# Patient Record
Sex: FEMALE | Race: OTHER
[De-identification: names, ages, dates, MRNs, and addresses within clinical notes are randomized per-mention and may not be internally consistent; named-entity substitution may affect disease eponyms.]

---

## 2018-12-20 ENCOUNTER — HOSPITAL ENCOUNTER (EMERGENCY)
Age: 43
LOS: 1 days | Discharge: HOME | End: 2018-12-21

## 2018-12-20 ENCOUNTER — HOSPITAL ENCOUNTER (EMERGENCY)
Dept: HOSPITAL 91 - E/R | Age: 43
LOS: 1 days | Discharge: HOME | End: 2018-12-21
Payer: COMMERCIAL

## 2018-12-20 DIAGNOSIS — R07.89: Primary | ICD-10-CM

## 2018-12-20 DIAGNOSIS — R06.00: ICD-10-CM

## 2018-12-20 DIAGNOSIS — R07.81: ICD-10-CM

## 2018-12-20 LAB
ADD MAN DIFF?: NO
ADD UMIC: YES
ALANINE AMINOTRANSFERASE: 19 IU/L (ref 13–69)
ALBUMIN/GLOBULIN RATIO: 1.38
ALBUMIN: 4.3 G/DL (ref 3.3–4.9)
ALKALINE PHOSPHATASE: 86 IU/L (ref 42–121)
ANION GAP: 10 (ref 5–13)
ASPARTATE AMINO TRANSFERASE: 27 IU/L (ref 15–46)
BASOPHIL #: 0.1 10^3/UL (ref 0–0.1)
BASOPHILS %: 0.6 % (ref 0–2)
BILIRUBIN,DIRECT: 0 MG/DL (ref 0–0.2)
BILIRUBIN,TOTAL: 0.1 MG/DL (ref 0.2–1.3)
BLOOD UREA NITROGEN: 12 MG/DL (ref 7–20)
CALCIUM: 9.1 MG/DL (ref 8.4–10.2)
CARBON DIOXIDE: 28 MMOL/L (ref 21–31)
CHLORIDE: 104 MMOL/L (ref 97–110)
CREATININE: 0.58 MG/DL (ref 0.44–1)
EOSINOPHILS #: 0.4 10^3/UL (ref 0–0.5)
EOSINOPHILS %: 4.6 % (ref 0–7)
GLOBULIN: 3.1 G/DL (ref 1.3–3.2)
GLUCOSE: 93 MG/DL (ref 70–220)
HEMATOCRIT: 37.8 % (ref 37–47)
HEMOGLOBIN: 12.1 G/DL (ref 12–16)
IMMATURE GRANS #M: 0.01 10^3/UL (ref 0–0.03)
IMMATURE GRANS % (M): 0.1 % (ref 0–0.43)
INR: 0.98
LIPASE: 87 U/L (ref 23–300)
LYMPHOCYTES #: 3.1 10^3/UL (ref 0.8–2.9)
LYMPHOCYTES %: 35.2 % (ref 15–51)
MEAN CORPUSCULAR HEMOGLOBIN: 28.3 PG (ref 29–33)
MEAN CORPUSCULAR HGB CONC: 32 G/DL (ref 32–37)
MEAN CORPUSCULAR VOLUME: 88.3 FL (ref 82–101)
MEAN PLATELET VOLUME: 9.9 FL (ref 7.4–10.4)
MONOCYTE #: 0.7 10^3/UL (ref 0.3–0.9)
MONOCYTES %: 7.8 % (ref 0–11)
NEUTROPHIL #: 4.5 10^3/UL (ref 1.6–7.5)
NEUTROPHILS %: 51.7 % (ref 39–77)
NUCLEATED RED BLOOD CELLS #: 0 10^3/UL (ref 0–0)
NUCLEATED RED BLOOD CELLS%: 0 /100WBC (ref 0–0)
PLATELET COUNT: 266 10^3/UL (ref 140–415)
POTASSIUM: 4.2 MMOL/L (ref 3.5–5.1)
PROTIME: 13.1 SEC (ref 11.9–14.9)
PT RATIO: 1
RED BLOOD COUNT: 4.28 10^6/UL (ref 4.2–5.4)
RED CELL DISTRIBUTION WIDTH: 13.1 % (ref 11.5–14.5)
SODIUM: 142 MMOL/L (ref 135–144)
TOTAL PROTEIN: 7.4 G/DL (ref 6.1–8.1)
TROPONIN-I: < 0.012 NG/ML (ref 0–0.12)
UR ASCORBIC ACID: NEGATIVE MG/DL
UR BACTERIA: (no result) /HPF
UR BILIRUBIN (DIP): NEGATIVE MG/DL
UR BLOOD (DIP): NEGATIVE MG/DL
UR CLARITY: CLEAR
UR COLOR: (no result)
UR GLUCOSE (DIP): NEGATIVE MG/DL
UR KETONES (DIP): NEGATIVE MG/DL
UR LEUKOCYTE ESTERASE (DIP): (no result) LEU/UL
UR NITRITE (DIP): NEGATIVE MG/DL
UR PH (DIP): 8 (ref 5–9)
UR RBC: 0 /HPF (ref 0–5)
UR SPECIFIC GRAVITY (DIP): 1.01 (ref 1–1.03)
UR SQUAMOUS EPITHELIAL CELL: (no result) /HPF
UR TOTAL PROTEIN (DIP): NEGATIVE MG/DL
UR UROBILINOGEN (DIP): NEGATIVE MG/DL
UR WBC: 1 /HPF (ref 0–5)
WHITE BLOOD COUNT: 8.8 10^3/UL (ref 4.8–10.8)

## 2018-12-20 PROCEDURE — 83690 ASSAY OF LIPASE: CPT

## 2018-12-20 PROCEDURE — 99284 EMERGENCY DEPT VISIT MOD MDM: CPT

## 2018-12-20 PROCEDURE — 81001 URINALYSIS AUTO W/SCOPE: CPT

## 2018-12-20 PROCEDURE — 85610 PROTHROMBIN TIME: CPT

## 2018-12-20 PROCEDURE — 93005 ELECTROCARDIOGRAM TRACING: CPT

## 2018-12-20 PROCEDURE — 85025 COMPLETE CBC W/AUTO DIFF WBC: CPT

## 2018-12-20 PROCEDURE — 36415 COLL VENOUS BLD VENIPUNCTURE: CPT

## 2018-12-20 PROCEDURE — 71045 X-RAY EXAM CHEST 1 VIEW: CPT

## 2018-12-20 PROCEDURE — 80053 COMPREHEN METABOLIC PANEL: CPT

## 2018-12-20 PROCEDURE — 84703 CHORIONIC GONADOTROPIN ASSAY: CPT

## 2018-12-20 PROCEDURE — 84484 ASSAY OF TROPONIN QUANT: CPT

## 2018-12-20 RX ADMIN — THIAMINE HYDROCHLORIDE 1 MLS/HR: 100 INJECTION, SOLUTION INTRAMUSCULAR; INTRAVENOUS at 22:29

## 2018-12-21 RX ADMIN — LORAZEPAM 1 MG: 2 INJECTION, SOLUTION INTRAMUSCULAR; INTRAVENOUS at 01:08

## 2019-04-11 ENCOUNTER — EVALUATION (OUTPATIENT)
Dept: PHYSICAL THERAPY | Facility: CLINIC | Age: 44
End: 2019-04-11
Payer: COMMERCIAL

## 2019-04-11 DIAGNOSIS — M54.41 ACUTE RIGHT-SIDED LOW BACK PAIN WITH RIGHT-SIDED SCIATICA: Primary | ICD-10-CM

## 2019-04-11 PROCEDURE — 97161 PT EVAL LOW COMPLEX 20 MIN: CPT

## 2019-04-15 ENCOUNTER — OFFICE VISIT (OUTPATIENT)
Dept: PHYSICAL THERAPY | Facility: CLINIC | Age: 44
End: 2019-04-15
Payer: COMMERCIAL

## 2019-04-15 DIAGNOSIS — M54.41 ACUTE RIGHT-SIDED LOW BACK PAIN WITH RIGHT-SIDED SCIATICA: Primary | ICD-10-CM

## 2019-04-15 PROCEDURE — 97112 NEUROMUSCULAR REEDUCATION: CPT | Performed by: PHYSICAL THERAPIST

## 2019-04-18 ENCOUNTER — OFFICE VISIT (OUTPATIENT)
Dept: PHYSICAL THERAPY | Facility: CLINIC | Age: 44
End: 2019-04-18
Payer: COMMERCIAL

## 2019-04-18 DIAGNOSIS — M54.41 ACUTE RIGHT-SIDED LOW BACK PAIN WITH RIGHT-SIDED SCIATICA: Primary | ICD-10-CM

## 2019-04-18 PROCEDURE — 97112 NEUROMUSCULAR REEDUCATION: CPT | Performed by: PHYSICAL THERAPIST

## 2019-04-18 PROCEDURE — 97140 MANUAL THERAPY 1/> REGIONS: CPT | Performed by: PHYSICAL THERAPIST

## 2019-04-18 PROCEDURE — 97110 THERAPEUTIC EXERCISES: CPT | Performed by: PHYSICAL THERAPIST

## 2019-04-22 ENCOUNTER — APPOINTMENT (OUTPATIENT)
Dept: PHYSICAL THERAPY | Facility: CLINIC | Age: 44
End: 2019-04-22
Payer: COMMERCIAL

## 2019-04-23 ENCOUNTER — HOSPITAL ENCOUNTER (EMERGENCY)
Dept: HOSPITAL 91 - FTE | Age: 44
Discharge: HOME | End: 2019-04-23
Payer: COMMERCIAL

## 2019-04-23 ENCOUNTER — HOSPITAL ENCOUNTER (EMERGENCY)
Dept: HOSPITAL 10 - FTE | Age: 44
Discharge: HOME | End: 2019-04-23
Payer: COMMERCIAL

## 2019-04-23 VITALS
WEIGHT: 136.69 LBS | HEIGHT: 63 IN | BODY MASS INDEX: 24.22 KG/M2 | BODY MASS INDEX: 24.22 KG/M2 | HEIGHT: 63 IN | WEIGHT: 136.69 LBS

## 2019-04-23 VITALS — DIASTOLIC BLOOD PRESSURE: 83 MMHG | HEART RATE: 95 BPM | RESPIRATION RATE: 18 BRPM | SYSTOLIC BLOOD PRESSURE: 151 MMHG

## 2019-04-23 DIAGNOSIS — R09.89: Primary | ICD-10-CM

## 2019-04-23 PROCEDURE — 96372 THER/PROPH/DIAG INJ SC/IM: CPT

## 2019-04-23 PROCEDURE — 99284 EMERGENCY DEPT VISIT MOD MDM: CPT

## 2019-04-23 RX ADMIN — FAMOTIDINE 1 MG: 20 TABLET ORAL at 07:47

## 2019-04-23 RX ADMIN — DEXAMETHASONE SODIUM PHOSPHATE 1 MG: 10 INJECTION, SOLUTION INTRAMUSCULAR; INTRAVENOUS at 07:47

## 2019-04-23 NOTE — ERD
ER Documentation


Chief Complaint


Chief Complaint





pt bib self with c/o scratchy throat , after possibly taking some meds,





HPI


43-year-old female presenting with a scratchy throat and irritation after taking


medication at home.  Patient took baclofen and medication for her migraine.  She


took some Benadryl last night that alleviated her symptoms however she still 


feels that there is some scratching irritation to her throat.  She is worried 


about allergic reaction.  She also has some sores under her tongue which are 


irritating and have been there for the last few weeks.  Denies any fevers.  


Denies medical problems.  NKDA.  Surgical history denies.  Social history denies





ROS


All systems reviewed and are negative except as per history of present illness.





Medications


Home Meds


Active Scripts


Hydrocortisone* Topical (Hydrocortisone* Topical) 2.5%-28.3 Gm Cream..g., 1 


APPLIC TOP BID, #1 TUB


   Prov:GIOVANNA STEWART PA-C         4/23/19


Nystatin (Nystatin) 100,000 Unit/1 Ml Oral.susp, 2 ML PO QID for 7 Days, OZ


   Swish and swallow


   Prov:GIOVANNA STEWART PA-C         4/23/19


Carbamide Peroxide (Gly-Oxide) 15 Ml Solution, 15 ML MM BID, #1


   Prov:GIOVANNA STEWART PA-C         4/23/19


Methylprednisolone* (Medrol* DOSE PACK) 4 Mg/Dose-Pack Tab.ds.pk, 4 MG PO .AS 


DIRECTED, #1 PACKET


   Prov:GIOVANNA STEWART PA-C         4/23/19


Diphenhydramine Hcl* (Benadryl*) 25 Mg Cap, 25 MG PO Q6, #30 CAP


   Prov:GIOVANNA STEWART PA-C         4/23/19


Ibuprofen* (Motrin*) 600 Mg Tab, 600 MG PO Q6H PRN for PAIN AND/OR INFLAMMATION,


#30 TAB


   Prov:GEREMIAS MOORE MD         12/21/18


Reported Medications


Sumatriptan Succinate* (Imitrex*) Unknown Strength Tablet, 1 TAB PO AS NEEDED 


PRN for MIGRAINE HEADACHE, TAB


   May repeat after 2 hours if needed;  mg/24 hours


   12/20/18


Ibuprofen* (Ibuprofen*) 600 Mg Tablet, 600 MG PO Q6H PRN for AS NEEDED, TAB


   12/20/18





Allergies


Allergies:  


Coded Allergies:  


     No Known Allergy (Unverified , 12/20/18)





PMhx/Soc


Medical and Surgical Hx:  pt denies Medical Hx, pt denies Surgical Hx


History of Surgery:  No


Anesthesia Reaction:  No


Hx Neurological Disorder:  No


Hx Respiratory Disorders:  No


Hx Cardiac Disorders:  No


Hx Psychiatric Problems:  No


Hx Miscellaneous Medical Probl:  No


Hx Alcohol Use:  No


Hx Substance Use:  No


Hx Tobacco Use:  No


Smoking Status:  Never smoker





FmHx


Family History:  No diabetes, No coronary disease, No other





Physical Exam


Vitals





Vital Signs


  Date      Temp  Pulse  Resp  B/P (MAP)   Pulse Ox  O2          O2 Flow    FiO2


Time                                                 Delivery    Rate


   4/23/19  97.6     95    18      151/83       100


     07:02                          (105)





Physical Exam


GENERAL: The patient is well-appearing, well-nourished, in no acute distress


HEENT: Atraumatic.  Conjunctivae are pink.  Pupils equal, round, and reactive to


light.  There is no scleral icterus.  Tympanic membranes clear bilaterally.  


Oropharynx clear.  No nystagmus or photophobia.  Small open sores noted under 


the tongue adjacent to the frenulum.


NECK: C-spine is soft and supple.  There is no meningismus.  There is no 


cervical lymphadenopathy.  


CHEST: Clear to auscultation bilaterally.  There are no rales, wheezes or rho


nchi.


HEART: Regular rate and rhythm.  No murmurs, clicks, rubs or gallops.  


SKIN: Erythematous site noted to the right arm with small nodule with no 


fluctuance or purulence.  No lymphatic streaking.  No vesicles


Results 24 hrs





Current Medications


 Medications
   Dose
          Sig/Fermin
       Start Time
   Status  Last


 (Trade)       Ordered        Route
 PRN     Stop Time              Admin
Dose


                              Reason                                Admin


                10 mg          ONCE  ONCE
    4/23/19       DC           4/23/19


Dexamethasone                 IM
            07:30
                       07:47




  (Decadron)                                4/23/19 07:31


 Famotidine
    20 mg          ONCE  ONCE
    4/23/19       DC           4/23/19


(Pepcid)                      PO
            07:30
                       07:47



                                             4/23/19 07:31








Procedures/MDM


MDM: 43-year-old female presenting with throat irritation.  I have low suspicion


for bacterial infection.  Patient may have a component of thrush however there 


is no white exudates noted.  I will treat given the patient has had symptoms for


the last 2 weeks with no resolution however I have high suspicion that this is 


likely viral.  I have low suspicion for anaphylaxis or angioedema.  Patient's 


exam is non-concerning.  Patient is discharged with strict ER precautions and 


told to follow-up with primary care within 1-2 days for close evaluation.  All 


questions answered at discharge





Departure


Diagnosis:  


   Primary Impression:  


   Allergic reaction


Condition:  Stable


Patient Instructions:  Allergic Reaction, Drug


Referrals:  


[facility]


YOU HAVE RECEIVED A MEDICAL SCREENING EXAM AND THE RESULTS INDICATE THAT YOU DO 


NOT HAVE A CONDITION THAT REQUIRES URGENT TREATMENT IN THE EMERGENCY DEPARTMENT.





FURTHER EVALUATION AND TREATMENT OF YOUR CONDITION CAN WAIT UNTIL YOU ARE SEEN 


IN YOUR DOCTORS OFFICE WITHIN THE NEXT 1-2 DAYS. IT IS YOUR RESPONSIBILITY TO 


MAKE AN APPOINTMENT FOR FOLOW-UP CARE.





IF YOU HAVE A PRIMARY DOCTOR


--you should call your primary doctor and schedule an appointment





IF YOU DO NOT HAVE A PRIMARY DOCTOR YOU CAN CALL OUR PHYSICIAN REFERRAL HOTLINE 


AT


 [phone] 





IF YOU CAN NOT AFFORD TO SEE A PHYSICIAN YOU CAN CHOSE FROM THE FOLLOWING 


[facility]





[facility] [phone] [address] [facility] [phone] [address] [facility]. [facility] [phone] [address] [facility] [phone] [address] [facility] [phone]


[address] [facility]. [facility]. [phone]


[address]





Additional Instructions:  


FOLLOW UP WITH YOUR PRIMARY CARE PHYSICIAN TOMORROW.Return to this facility if 


you are not improving as expected.











GIOVANNA STEWART PA-C       Apr 23, 2019 08:24

## 2019-04-25 ENCOUNTER — APPOINTMENT (OUTPATIENT)
Dept: PHYSICAL THERAPY | Facility: CLINIC | Age: 44
End: 2019-04-25
Payer: COMMERCIAL

## 2024-04-18 ENCOUNTER — OFFICE VISIT (OUTPATIENT)
Dept: FAMILY MEDICINE CLINIC | Facility: CLINIC | Age: 49
End: 2024-04-18
Payer: COMMERCIAL

## 2024-04-18 VITALS
OXYGEN SATURATION: 99 % | DIASTOLIC BLOOD PRESSURE: 76 MMHG | HEIGHT: 63 IN | BODY MASS INDEX: 22.5 KG/M2 | SYSTOLIC BLOOD PRESSURE: 116 MMHG | WEIGHT: 127 LBS | HEART RATE: 79 BPM

## 2024-04-18 DIAGNOSIS — Z13.1 SCREENING FOR DIABETES MELLITUS: ICD-10-CM

## 2024-04-18 DIAGNOSIS — Z12.4 CERVICAL CANCER SCREENING: ICD-10-CM

## 2024-04-18 DIAGNOSIS — Z11.59 NEED FOR HEPATITIS C SCREENING TEST: ICD-10-CM

## 2024-04-18 DIAGNOSIS — R53.83 OTHER FATIGUE: ICD-10-CM

## 2024-04-18 DIAGNOSIS — Z78.9 VEGETARIAN: ICD-10-CM

## 2024-04-18 DIAGNOSIS — Z12.11 SCREEN FOR COLON CANCER: ICD-10-CM

## 2024-04-18 DIAGNOSIS — Z12.31 ENCOUNTER FOR SCREENING MAMMOGRAM FOR BREAST CANCER: ICD-10-CM

## 2024-04-18 DIAGNOSIS — L84 CALLUS OF FOOT: Primary | ICD-10-CM

## 2024-04-18 DIAGNOSIS — Z13.6 SCREENING FOR CARDIOVASCULAR CONDITION: ICD-10-CM

## 2024-04-18 PROCEDURE — 99204 OFFICE O/P NEW MOD 45 MIN: CPT | Performed by: FAMILY MEDICINE

## 2024-04-18 NOTE — ASSESSMENT & PLAN NOTE
She is vegetarian she does not eat fish or chicken, she feels fatigue, will get her B12 level check

## 2024-04-18 NOTE — ASSESSMENT & PLAN NOTE
She has bilateral calluses on big toes medial part on both feet, she feels irritation and the turn white in color after standing for hours, she will see the podiatrist

## 2024-04-18 NOTE — ASSESSMENT & PLAN NOTE
Fatigue lately for long time, she says she has been very active she does not take rest she is working at [employer] and at home she is very active, she is purely vegetarian, advised to get labs and follow-up

## 2024-04-18 NOTE — PROGRESS NOTES
Name: Reddy Houston      : 1975      MRN: [id number]  Encounter Provider: Darlyn Andrew MD  Encounter Date: 2024   Encounter department: [facility]    Assessment & Plan     1. Callus of foot  Assessment & Plan:  She has bilateral calluses on big toes medial part on both feet, she feels irritation and the turn white in color after standing for hours, she will see the podiatrist    Orders:  -     Ambulatory Referral to Podiatry; Future    2. Encounter for screening mammogram for breast cancer  -     Mammo screening bilateral w 3d & cad; Future    3. Screen for colon cancer  -     Cologuard    4. Cervical cancer screening  -     Ambulatory Referral to Obstetrics / Gynecology; Future    5. Need for hepatitis C screening test  -     Hepatitis C antibody; Future    6. Other fatigue  Assessment & Plan:  Fatigue lately for long time, she says she has been very active she does not take rest she is working at [employer] and at home she is very active, she is purely vegetarian, advised to get labs and follow-up    Orders:  -     CBC and differential; Future; Expected date: 2024  -     Comprehensive metabolic panel; Future; Expected date: 2024  -     Lipid panel; Future; Expected date: 2024  -     TSH, 3rd generation; Future; Expected date: 2024  -     Methylmalonic acid, serum; Future    7. Screening for diabetes mellitus  -     CBC and differential; Future; Expected date: 2024  -     Comprehensive metabolic panel; Future; Expected date: 2024  -     Hemoglobin A1C; Future; Expected date: 2024    8. Screening for cardiovascular condition  -     CBC and differential; Future; Expected date: 2024  -     Comprehensive metabolic panel; Future; Expected date: 2024  -     Lipid panel; Future; Expected date: 2024  -     TSH, 3rd generation; Future; Expected date: 2024    9. Vegetarian  Assessment & Plan:  She is vegetarian she does not eat  fish or chicken, she feels fatigue, will get her B12 level check    Orders:  -     Methylmalonic acid, serum; Future         Subjective     She is a new patient, she says she has not been to the doctor for many years.  He is  lives with her 2 children, she has 2 C-sections .  She is a vegetarian, she says she has been very active she works at [employer] and she is busy all the time, but lately she has been feeling more tired fatigue she has menopause for almost 1 year initially she had menopausal symptoms like hot flashes craving for the sweets and then she gained weight but now she is back to her normal diet and her weight is back to her normal.      Review of Systems   Constitutional:  Positive for fatigue. Negative for activity change, appetite change, chills, fever and unexpected weight change.   HENT:  Negative for congestion, ear discharge, ear pain, nosebleeds, postnasal drip, rhinorrhea, sinus pressure, sneezing, sore throat, trouble swallowing and voice change.    Eyes:  Negative for photophobia, pain, discharge, redness and itching.   Respiratory:  Negative for cough, chest tightness, shortness of breath and wheezing.    Cardiovascular:  Negative for chest pain, palpitations and leg swelling.   Gastrointestinal:  Negative for abdominal pain, constipation, diarrhea, nausea and vomiting.   Endocrine: Negative for polyuria.   Genitourinary:  Negative for dysuria, frequency and urgency.   Musculoskeletal:  Negative for arthralgias, back pain, myalgias and neck pain.   Skin:  Negative for color change, pallor and rash.        She has callus on feet and gets irritation in the callus after standing for hours, callus on medial part of big toes    Allergic/Immunologic: Negative for environmental allergies and food allergies.   Neurological:  Negative for dizziness, weakness, light-headedness and headaches.   Hematological:  Negative for adenopathy. Does not bruise/bleed easily.   Psychiatric/Behavioral:   "Negative for behavioral problems and sleep disturbance. The patient is not nervous/anxious.        History reviewed. No pertinent past medical history.  History reviewed. No pertinent surgical history.  History reviewed. No pertinent family history.  Social History     Socioeconomic History   • Marital status: /Civil Union     Spouse name: None   • Number of children: None   • Years of education: None   • Highest education level: None   Occupational History   • None   Tobacco Use   • Smoking status: Never   • Smokeless tobacco: Never   Vaping Use   • Vaping status: Never Used   Substance and Sexual Activity   • Alcohol use: Never   • Drug use: Never   • Sexual activity: None   Other Topics Concern   • None   Social History Narrative   • None     Social Determinants of Health     Financial Resource Strain: Not on file   Food Insecurity: Not on file   Transportation Needs: Not on file   Physical Activity: Not on file   Stress: Not on file   Social Connections: Not on file   Intimate Partner Violence: Not on file   Housing Stability: Not on file     No current outpatient medications on file prior to visit.     No Known Allergies  Immunization History   Administered Date(s) Administered   • COVID-19 PFIZER VACCINE 0.3 ML IM 04/08/2021, 04/30/2021       Objective     /76   Pulse 79   Ht 5' 3\" (1.6 m)   Wt 57.6 kg (127 lb)   SpO2 99%   BMI 22.50 kg/m²     Physical Exam  Vitals and nursing note reviewed.   Constitutional:       Appearance: Normal appearance. She is well-developed. She is not ill-appearing or toxic-appearing.   HENT:      Right Ear: Tympanic membrane normal.      Left Ear: Tympanic membrane normal.      Nose: Nose normal.   Eyes:      Extraocular Movements: Extraocular movements intact.   Neck:      Thyroid: No thyromegaly.   Cardiovascular:      Rate and Rhythm: Normal rate and regular rhythm.      Heart sounds: Normal heart sounds. No murmur heard.  Pulmonary:      Effort: Pulmonary " effort is normal.      Breath sounds: Normal breath sounds. No wheezing or rales.   Abdominal:      Palpations: Abdomen is soft.   Musculoskeletal:      Cervical back: Normal range of motion and neck supple.      Right lower leg: No edema.      Left lower leg: No edema.   Lymphadenopathy:      Cervical: No cervical adenopathy.   Skin:     Findings: No erythema or rash.      Comments: Callus on medial part of big toes    Neurological:      General: No focal deficit present.      Mental Status: She is alert.   Psychiatric:         Mood and Affect: Mood normal.     Darlyn Andrew MD

## 2024-04-30 ENCOUNTER — TELEPHONE (OUTPATIENT)
Age: 49
End: 2024-04-30

## 2024-04-30 NOTE — TELEPHONE ENCOUNTER
Patient calling to request when her appointments were scheduled.  All questions answered. Reviewed patient's mammogram and annual dates and times and she verbalized understanding.

## 2024-05-03 ENCOUNTER — LAB (OUTPATIENT)
Dept: LAB | Facility: CLINIC | Age: 49
End: 2024-05-03
Payer: COMMERCIAL

## 2024-05-03 DIAGNOSIS — R53.83 OTHER FATIGUE: ICD-10-CM

## 2024-05-03 DIAGNOSIS — Z13.1 SCREENING FOR DIABETES MELLITUS: ICD-10-CM

## 2024-05-03 DIAGNOSIS — Z78.9 VEGETARIAN: ICD-10-CM

## 2024-05-03 DIAGNOSIS — Z11.59 NEED FOR HEPATITIS C SCREENING TEST: ICD-10-CM

## 2024-05-03 DIAGNOSIS — Z13.6 SCREENING FOR CARDIOVASCULAR CONDITION: ICD-10-CM

## 2024-05-03 LAB
ALBUMIN SERPL BCP-MCNC: 4.5 G/DL (ref 3.5–5)
ALP SERPL-CCNC: 81 U/L (ref 34–104)
ALT SERPL W P-5'-P-CCNC: 13 U/L (ref 7–52)
ANION GAP SERPL CALCULATED.3IONS-SCNC: 7 MMOL/L (ref 4–13)
AST SERPL W P-5'-P-CCNC: 17 U/L (ref 13–39)
BASOPHILS # BLD AUTO: 0.02 THOUSANDS/ÂΜL (ref 0–0.1)
BASOPHILS NFR BLD AUTO: 0 % (ref 0–1)
BILIRUB SERPL-MCNC: 0.67 MG/DL (ref 0.2–1)
BUN SERPL-MCNC: 11 MG/DL (ref 5–25)
CALCIUM SERPL-MCNC: 9.6 MG/DL (ref 8.4–10.2)
CHLORIDE SERPL-SCNC: 107 MMOL/L (ref 96–108)
CHOLEST SERPL-MCNC: 156 MG/DL
CO2 SERPL-SCNC: 27 MMOL/L (ref 21–32)
CREAT SERPL-MCNC: 0.59 MG/DL (ref 0.6–1.3)
EOSINOPHIL # BLD AUTO: 0.07 THOUSAND/ÂΜL (ref 0–0.61)
EOSINOPHIL NFR BLD AUTO: 1 % (ref 0–6)
ERYTHROCYTE [DISTWIDTH] IN BLOOD BY AUTOMATED COUNT: 13 % (ref 11.6–15.1)
GFR SERPL CREATININE-BSD FRML MDRD: 109 ML/MIN/1.73SQ M
GLUCOSE P FAST SERPL-MCNC: 87 MG/DL (ref 65–99)
HCT VFR BLD AUTO: 39 % (ref 34.8–46.1)
HCV AB SER QL: NORMAL
HDLC SERPL-MCNC: 60 MG/DL
HGB BLD-MCNC: 12.8 G/DL (ref 11.5–15.4)
IMM GRANULOCYTES # BLD AUTO: 0.01 THOUSAND/UL (ref 0–0.2)
IMM GRANULOCYTES NFR BLD AUTO: 0 % (ref 0–2)
LDLC SERPL CALC-MCNC: 80 MG/DL (ref 0–100)
LYMPHOCYTES # BLD AUTO: 2 THOUSANDS/ÂΜL (ref 0.6–4.47)
LYMPHOCYTES NFR BLD AUTO: 36 % (ref 14–44)
MCH RBC QN AUTO: 30 PG (ref 26.8–34.3)
MCHC RBC AUTO-ENTMCNC: 32.8 G/DL (ref 31.4–37.4)
MCV RBC AUTO: 91 FL (ref 82–98)
MONOCYTES # BLD AUTO: 0.35 THOUSAND/ÂΜL (ref 0.17–1.22)
MONOCYTES NFR BLD AUTO: 6 % (ref 4–12)
NEUTROPHILS # BLD AUTO: 3.08 THOUSANDS/ÂΜL (ref 1.85–7.62)
NEUTS SEG NFR BLD AUTO: 57 % (ref 43–75)
NONHDLC SERPL-MCNC: 96 MG/DL
NRBC BLD AUTO-RTO: 0 /100 WBCS
PLATELET # BLD AUTO: 185 THOUSANDS/UL (ref 149–390)
PMV BLD AUTO: 12.7 FL (ref 8.9–12.7)
POTASSIUM SERPL-SCNC: 4 MMOL/L (ref 3.5–5.3)
PROT SERPL-MCNC: 7.1 G/DL (ref 6.4–8.4)
RBC # BLD AUTO: 4.27 MILLION/UL (ref 3.81–5.12)
SODIUM SERPL-SCNC: 141 MMOL/L (ref 135–147)
TRIGL SERPL-MCNC: 80 MG/DL
TSH SERPL DL<=0.05 MIU/L-ACNC: 5.69 UIU/ML (ref 0.45–4.5)
WBC # BLD AUTO: 5.53 THOUSAND/UL (ref 4.31–10.16)

## 2024-05-03 PROCEDURE — 85025 COMPLETE CBC W/AUTO DIFF WBC: CPT

## 2024-05-03 PROCEDURE — 84443 ASSAY THYROID STIM HORMONE: CPT

## 2024-05-03 PROCEDURE — 86803 HEPATITIS C AB TEST: CPT

## 2024-05-03 PROCEDURE — 83918 ORGANIC ACIDS TOTAL QUANT: CPT

## 2024-05-03 PROCEDURE — 36415 COLL VENOUS BLD VENIPUNCTURE: CPT

## 2024-05-03 PROCEDURE — 83036 HEMOGLOBIN GLYCOSYLATED A1C: CPT

## 2024-05-03 PROCEDURE — 80061 LIPID PANEL: CPT

## 2024-05-03 PROCEDURE — 80053 COMPREHEN METABOLIC PANEL: CPT

## 2024-05-04 LAB
EST. AVERAGE GLUCOSE BLD GHB EST-MCNC: 123 MG/DL
HBA1C MFR BLD: 5.9 %

## 2024-05-07 ENCOUNTER — TELEPHONE (OUTPATIENT)
Dept: FAMILY MEDICINE CLINIC | Facility: CLINIC | Age: 49
End: 2024-05-07

## 2024-05-07 NOTE — TELEPHONE ENCOUNTER
----- Message from Darlyn Andrew MD sent at 5/7/2024  8:15 AM EDT -----  Patient needs follow-up appointment, has  Abnormal blood work

## 2024-05-08 LAB — METHYLMALONATE SERPL-SCNC: 910 NMOL/L (ref 0–378)

## 2024-05-09 ENCOUNTER — TELEPHONE (OUTPATIENT)
Dept: FAMILY MEDICINE CLINIC | Facility: CLINIC | Age: 49
End: 2024-05-09

## 2024-05-09 NOTE — RESULT ENCOUNTER NOTE
Please schedule her for follow-up visit, she has elevated TSH and B12 deficiency, she needs to have follow-up visit in the office so we can start her treatment

## 2024-05-09 NOTE — TELEPHONE ENCOUNTER
----- Message from Darlyn Andrew MD sent at 5/9/2024 12:02 PM EDT -----  Please schedule her for follow-up visit, she has elevated TSH and B12 deficiency, she needs to have follow-up visit in the office so we can start her treatment

## 2024-05-18 PROBLEM — Z11.59 NEED FOR HEPATITIS C SCREENING TEST: Status: RESOLVED | Noted: 2024-04-18 | Resolved: 2024-05-18

## 2024-05-18 PROBLEM — Z13.6 SCREENING FOR CARDIOVASCULAR CONDITION: Status: RESOLVED | Noted: 2024-04-18 | Resolved: 2024-05-18

## 2024-05-18 PROBLEM — Z12.4 CERVICAL CANCER SCREENING: Status: RESOLVED | Noted: 2024-04-18 | Resolved: 2024-05-18

## 2024-05-21 ENCOUNTER — OFFICE VISIT (OUTPATIENT)
Dept: FAMILY MEDICINE CLINIC | Facility: CLINIC | Age: 49
End: 2024-05-21
Payer: COMMERCIAL

## 2024-05-21 VITALS
SYSTOLIC BLOOD PRESSURE: 100 MMHG | DIASTOLIC BLOOD PRESSURE: 60 MMHG | OXYGEN SATURATION: 95 % | BODY MASS INDEX: 22.68 KG/M2 | HEIGHT: 63 IN | WEIGHT: 128 LBS | RESPIRATION RATE: 16 BRPM | HEART RATE: 94 BPM

## 2024-05-21 DIAGNOSIS — Z78.9 VEGETARIAN: ICD-10-CM

## 2024-05-21 DIAGNOSIS — E53.8 B12 DEFICIENCY: Primary | ICD-10-CM

## 2024-05-21 DIAGNOSIS — E03.8 TSH (THYROID-STIMULATING HORMONE DEFICIENCY): ICD-10-CM

## 2024-05-21 PROCEDURE — 99214 OFFICE O/P EST MOD 30 MIN: CPT | Performed by: FAMILY MEDICINE

## 2024-05-21 RX ORDER — CYANOCOBALAMIN (VITAMIN B-12) 500 MCG
500 TABLET ORAL DAILY
Qty: 90 TABLET | Refills: 2 | Status: SHIPPED | OUTPATIENT
Start: 2024-05-21

## 2024-05-21 NOTE — ASSESSMENT & PLAN NOTE
She feels tired, TSH is high, thyroid seems to be full, nontender, she will get the repeat TSH and T3-T4 in 6 weeks and follow-up if level remains elevated will start her on levothyroxine

## 2024-05-21 NOTE — ASSESSMENT & PLAN NOTE
She has B12 deficiency, advised to take B12 500 mcg daily and will repeat labs in 6 months, she does not want to get lab order now as she will forget, will order next time

## 2024-05-21 NOTE — PROGRESS NOTES
Ambulatory Visit  Name: Reddy Houston      : 1975      MRN: [id number]  Encounter Provider: Darlyn Andrew MD  Encounter Date: 2024   Encounter department: [facility]    Assessment & Plan   1. B12 deficiency  Assessment & Plan:  She has B12 deficiency, advised to take B12 500 mcg daily and will repeat labs in 6 months, she does not want to get lab order now as she will forget, will order next time  Orders:  -     vitamin B-12 (CYANOCOBALAMIN) 500 MCG TABS; Take 1 tablet (500 mcg total) by mouth daily  2. TSH (thyroid-stimulating hormone deficiency)  Assessment & Plan:  She feels tired, TSH is high, thyroid seems to be full, nontender, she will get the repeat TSH and T3-T4 in 6 weeks and follow-up if level remains elevated will start her on levothyroxine  Orders:  -     TSH, 3rd generation with Free T4 reflex; Future; Expected date: 2024  -     T3, free; Future; Expected date: 2024  3. Vegetarian  Assessment & Plan:  Advised to take multivitamin daily         History of Present Illness     She is vegetarian, he does not take any vitamin or supplements, she feels tired, her labs were done, she is here for review labs,      Review of Systems   Constitutional:  Positive for fatigue.   HENT: Negative.     Eyes: Negative.    Respiratory: Negative.     Cardiovascular: Negative.    Gastrointestinal:  Negative for constipation.   Musculoskeletal: Negative.      History reviewed. No pertinent past medical history.  History reviewed. No pertinent surgical history.  Family History   Problem Relation Age of Onset   • Hyperlipidemia Mother    • No Known Problems Father    • Hyperlipidemia Brother      Social History     Tobacco Use   • Smoking status: Never   • Smokeless tobacco: Never   Vaping Use   • Vaping status: Never Used   Substance and Sexual Activity   • Alcohol use: Never   • Drug use: Never   • Sexual activity: Not on file     No current outpatient medications on file prior  "to visit.     No Known Allergies  Immunization History   Administered Date(s) Administered   • COVID-19 PFIZER VACCINE 0.3 ML IM 04/08/2021, 04/30/2021     Objective     /60   Pulse 94   Resp 16   Ht 5' 3\" (1.6 m)   Wt 58.1 kg (128 lb)   SpO2 95%   BMI 22.67 kg/m²     Physical Exam  Vitals and nursing note reviewed.   Constitutional:       Appearance: Normal appearance.   Neck:      Comments: Slight fullness in the front of the neck possible thyroid megaly  Cardiovascular:      Rate and Rhythm: Normal rate.      Heart sounds: No murmur heard.  Musculoskeletal:      Cervical back: Normal range of motion.       Administrative Statements         "

## 2024-07-11 ENCOUNTER — OFFICE VISIT (OUTPATIENT)
Dept: FAMILY MEDICINE CLINIC | Facility: CLINIC | Age: 49
End: 2024-07-11
Payer: COMMERCIAL

## 2024-07-11 VITALS
OXYGEN SATURATION: 98 % | HEIGHT: 63 IN | RESPIRATION RATE: 16 BRPM | HEART RATE: 77 BPM | BODY MASS INDEX: 24.63 KG/M2 | DIASTOLIC BLOOD PRESSURE: 62 MMHG | WEIGHT: 139 LBS | SYSTOLIC BLOOD PRESSURE: 100 MMHG

## 2024-07-11 DIAGNOSIS — E03.8 TSH (THYROID-STIMULATING HORMONE DEFICIENCY): ICD-10-CM

## 2024-07-11 DIAGNOSIS — M77.8 RIGHT ELBOW TENDINITIS: ICD-10-CM

## 2024-07-11 DIAGNOSIS — R73.01 IMPAIRED FASTING GLUCOSE: ICD-10-CM

## 2024-07-11 DIAGNOSIS — E53.8 B12 DEFICIENCY: ICD-10-CM

## 2024-07-11 DIAGNOSIS — F40.240 CLAUSTROPHOBIA: Primary | ICD-10-CM

## 2024-07-11 PROCEDURE — 99214 OFFICE O/P EST MOD 30 MIN: CPT | Performed by: FAMILY MEDICINE

## 2024-07-11 NOTE — ASSESSMENT & PLAN NOTE
She has dry skin, weight gain cold and heat sensitivity advised to get her TSH level checked soon, she was supposed to do but she forgot

## 2024-07-11 NOTE — ASSESSMENT & PLAN NOTE
Claustrophobic symptoms are getting worse with the time mostly at work , given excuse to avoid working in congested places and close spaces if her symptoms keep getting worse she need medication

## 2024-07-11 NOTE — PROGRESS NOTES
Ambulatory Visit  Name: Reddy Houston      : 1975      MRN: [id number]  Encounter Provider: Darlyn Andrew MD  Encounter Date: 2024   Encounter department: [facility]    Assessment & Plan   1. Claustrophobia  Assessment & Plan:  Claustrophobic symptoms are getting worse with the time mostly at work , given excuse to avoid working in congested places and close spaces if her symptoms keep getting worse she need medication  2. TSH (thyroid-stimulating hormone deficiency)  Assessment & Plan:  She has dry skin, weight gain cold and heat sensitivity advised to get her TSH level checked soon, she was supposed to do but she forgot  Orders:  -     TSH, 3rd generation with Free T4 reflex; Future; Expected date: 10/11/2024  3. Right elbow tendinitis  Assessment & Plan:  Tender on right lateral part of the right elbow, discussed with her that she has tendinitis and excuse given for her work so she avoid lifting things which can cause irritation to her elbow and she will see the orthopedic for tendinitis, she will use ice packs 3 times a day,, note is given for work  Orders:  -     Ambulatory Referral to Orthopedic Surgery; Future  4. B12 deficiency  Assessment & Plan:  Continue taking B12 and recheck labs in few months  Orders:  -     Methylmalonic acid, serum; Future; Expected date: 10/11/2024  5. Impaired fasting glucose  -     Hemoglobin A1C; Future; Expected date: 10/11/2024  -     Comprehensive metabolic panel; Future; Expected date: 10/11/2024         History of Present Illness     She says she has been noticing that she has been getting claustrophobic in close spaces and where multiple people are around her she feels her heart will stop  And her throat will be choking, but if she is in [employer] says she can do fine, she also has noticed on the airplanes when there are a lot of people she have the same feeling, even looking at the pictures where there are so many people she feels like if  "she is in the face she started feeling claustrophobic, she says with the time and age is getting worse, she is also noticing are more dry and wrinkly skin, she also feels cold sensitivity,  She also says that she had previously injuries to the right arm few things fell on her right arm and then she was treated at the work but now she feels sensitivity in the right hand if she lifts something with 1 hand she feels her arm is not supporting and she cannot use the scanner which is 2 pounds as it increase her sensitivity in her elbow but she does not know why she cannot do it but she can use the finger scanner and she can work fine she needs note for work so she can be given some accommodation for these 2 problems  Did not go for blood work as previously her TSH level is high, she has been gaining weight even though she is eating fine,      Review of Systems   Constitutional:  Positive for unexpected weight change.   Cardiovascular: Negative.    Gastrointestinal: Negative.    Endocrine: Positive for cold intolerance and heat intolerance.   Musculoskeletal:         Elbow pain     No past medical history on file.  No past surgical history on file.  Family History   Problem Relation Age of Onset   • Hyperlipidemia Mother    • No Known Problems Father    • Hyperlipidemia Brother      Social History     Tobacco Use   • Smoking status: Never   • Smokeless tobacco: Never   Vaping Use   • Vaping status: Never Used   Substance and Sexual Activity   • Alcohol use: Never   • Drug use: Never   • Sexual activity: Not on file     Current Outpatient Medications on File Prior to Visit   Medication Sig   • vitamin B-12 (CYANOCOBALAMIN) 500 MCG TABS Take 1 tablet (500 mcg total) by mouth daily     No Known Allergies  Immunization History   Administered Date(s) Administered   • COVID-19 PFIZER VACCINE 0.3 ML IM 04/08/2021, 04/30/2021     Objective     /62   Pulse 77   Resp 16   Ht 5' 3\" (1.6 m)   Wt 63 kg (139 lb)   SpO2 98%   " BMI 24.62 kg/m²     Physical Exam  Vitals and nursing note reviewed.   Constitutional:       General: She is not in acute distress.     Appearance: Normal appearance. She is not ill-appearing.   HENT:      Head: Normocephalic and atraumatic.      Nose: Nose normal. No congestion or rhinorrhea.      Mouth/Throat:      Pharynx: No posterior oropharyngeal erythema.   Eyes:      General: No scleral icterus.        Right eye: No discharge.         Left eye: No discharge.      Extraocular Movements: Extraocular movements intact.      Conjunctiva/sclera: Conjunctivae normal.   Cardiovascular:      Rate and Rhythm: Normal rate and regular rhythm.      Heart sounds: Normal heart sounds. No murmur heard.  Pulmonary:      Effort: Pulmonary effort is normal.      Breath sounds: Normal breath sounds. No wheezing or rales.   Musculoskeletal:         General: Tenderness present. No swelling or deformity. Normal range of motion.      Cervical back: Normal range of motion and neck supple. No muscular tenderness.      Right lower leg: No edema.      Left lower leg: No edema.      Comments: Tender on the lateral epicondyle   Lymphadenopathy:      Cervical: No cervical adenopathy.   Skin:     Coloration: Skin is not jaundiced or pale.      Findings: No erythema, lesion or rash.   Neurological:      General: No focal deficit present.      Mental Status: She is alert and oriented to person, place, and time.      Gait: Gait normal.   Psychiatric:         Mood and Affect: Mood normal.         Behavior: Behavior normal.

## 2024-07-11 NOTE — ASSESSMENT & PLAN NOTE
Tender on right lateral part of the right elbow, discussed with her that she has tendinitis and excuse given for her work so she avoid lifting things which can cause irritation to her elbow and she will see the orthopedic for tendinitis, she will use ice packs 3 times a day,, note is given for work

## 2024-07-12 ENCOUNTER — APPOINTMENT (OUTPATIENT)
Dept: LAB | Facility: CLINIC | Age: 49
End: 2024-07-12
Payer: COMMERCIAL

## 2024-07-12 DIAGNOSIS — E03.8 TSH (THYROID-STIMULATING HORMONE DEFICIENCY): ICD-10-CM

## 2024-07-12 LAB
T3FREE SERPL-MCNC: 3.47 PG/ML (ref 2.5–3.9)
T4 FREE SERPL-MCNC: 0.7 NG/DL (ref 0.61–1.12)
TSH SERPL DL<=0.05 MIU/L-ACNC: 5.4 UIU/ML (ref 0.45–4.5)

## 2024-07-12 PROCEDURE — 36415 COLL VENOUS BLD VENIPUNCTURE: CPT

## 2024-07-12 PROCEDURE — 84481 FREE ASSAY (FT-3): CPT

## 2024-07-12 PROCEDURE — 84443 ASSAY THYROID STIM HORMONE: CPT

## 2024-07-12 PROCEDURE — 84439 ASSAY OF FREE THYROXINE: CPT

## 2024-07-16 ENCOUNTER — TELEPHONE (OUTPATIENT)
Dept: FAMILY MEDICINE CLINIC | Facility: CLINIC | Age: 49
End: 2024-07-16

## 2024-07-16 DIAGNOSIS — E03.9 HYPOTHYROIDISM, UNSPECIFIED TYPE: Primary | ICD-10-CM

## 2024-07-16 RX ORDER — LEVOTHYROXINE SODIUM 0.03 MG/1
25 TABLET ORAL
Qty: 100 TABLET | Refills: 1 | Status: SHIPPED | OUTPATIENT
Start: 2024-07-16

## 2024-07-16 NOTE — TELEPHONE ENCOUNTER
----- Message from Darlyn Andrew MD sent at 7/16/2024  3:21 PM EDT -----  Please advise the patient that her thyroid function is still abnormal, I will start her on levothyroxine, she should take this medicine empty stomach in the morning 40 minutes before eating anything,  I sent the prescription to pharmacy and ordered blood work again to recheck in 6 weeks for thyroid function and then she should plan to schedule follow-up

## 2024-08-08 ENCOUNTER — OFFICE VISIT (OUTPATIENT)
Dept: OBGYN CLINIC | Facility: CLINIC | Age: 49
End: 2024-08-08
Payer: COMMERCIAL

## 2024-08-08 VITALS
HEART RATE: 90 BPM | SYSTOLIC BLOOD PRESSURE: 122 MMHG | WEIGHT: 140 LBS | HEIGHT: 63 IN | BODY MASS INDEX: 24.8 KG/M2 | DIASTOLIC BLOOD PRESSURE: 73 MMHG

## 2024-08-08 DIAGNOSIS — M77.11 LATERAL EPICONDYLITIS OF RIGHT ELBOW: Primary | ICD-10-CM

## 2024-08-08 PROCEDURE — 99203 OFFICE O/P NEW LOW 30 MIN: CPT | Performed by: SURGERY

## 2024-08-08 RX ORDER — MELOXICAM 7.5 MG/1
7.5 TABLET ORAL DAILY
Qty: 30 TABLET | Refills: 1 | Status: SHIPPED | OUTPATIENT
Start: 2024-08-08

## 2024-08-08 NOTE — PROGRESS NOTES
Yariel Herrmann M.D.  Attending, Orthopaedic Surgery  Hand, Wrist, and Elbow Surgery  [facility]      ORTHOPAEDIC HAND, WRIST, AND ELBOW OFFICE  VISIT       ASSESSMENT/PLAN:      49-year-old female with right elbow lateral epicondylitis     Treatment options were discussed in the form of bracing, formal therapy, and a anti-inflammatory. The patient was agreeable to this. A referral was provided to formal therapy in the office today. She was fitted and provided with a cock-up wrist brace she was advised to wear at night. A prescription was provided for Mobic. Discussed we can consider a steroid injection at her next visit if she does not see good relief from the above mentioned plan. A work note was provided allowing her to use the small scanner.    The patient verbalized understanding of exam findings and treatment plan. We engaged in the shared decision-making process and treatment options were discussed at length with the patient. Surgical and conservative management discussed today along with risks and benefits.    Diagnoses and all orders for this visit:    Lateral epicondylitis of right elbow  -     Ambulatory Referral to Orthopedic Surgery  -     Ambulatory Referral to PT/OT Hand Therapy; Future  -     Cock Up Wrist Splint        Follow Up:  Return in about 8 weeks (around 10/3/2024).    To Do Next Visit:  Re-evaluation of current issue      General Discussions:  Lateral Epicondylitis: The anatomy and physiology of lateral epicondylitis was discussed with the patient today.  Typically, degenerative changes in the extensor carpi radialis brevis muscle occur over time.  These degenerative changes appear as tears of the tendon on MRI. This creates pain over the lateral epicondyle (outside part of elbow).  This pain typically is made worse with palm down lifting activities as well as anything that involves strength and stability of the wrist.  The pain may radiate from the wrist up to the  "elbow.  At times, the shoulder may be weak as well which can predispose or cause continuation of the problem.  Conservative treatment usually cures a majority of patients; however, this may take up to 6-9 months.  Conservative treatment options typically include activity modification, therapy for strengthening of the shoulder and elbow, tennis elbow straps, and possible corticosteroid injections.  Corticosteroid injections are very effective at relieving pain but do not alter the natural history of this process.  Rather, steroid injections decrease the pain temporarily to allow for therapy to take place without discomfort. It was discussed that therapy to prevent recurrence is a \"life long\" process and that if patient relies on the steroid injection alone without performing therapeutic exercises the risk of recurrence is likely.  Typical home regimen includes heat and stretching and resisted wrist extension exercises discussed in the office. Surgery is required in fewer than 10% of patients.        ____________________________________________________________________________________________________________________________________________      CHIEF COMPLAINT:  Chief Complaint   Patient presents with    Right Elbow - Pain     Patient is having elbow pain and tingling       SUBJECTIVE:  Reddy Houston is a 49 y.o. year old ambidextrous female who presents to the office today for evaluation of right elbow pain. The patient states this has been ongoing for the past two years. She notes pain to the lateral aspect of her elbow. She notes increased pain lifting a gallon of milk. The patient states she injured her right wrist in a work incident 2 years ago when 40 lbs hit her right wrist. She states approx 2-3 months after an air conditioner also fell on her wrist in the same area. She states she was seen by a work comp doctor at that time and provided with an injection into her wrist. The patient is referred by Darlyn Andrew, " MD.      Pain/symptom timing:  Worse during the day when active  Pain/symptom context:  Worse with activites and work  Pain/symptom modifying factors:  Rest makes better, activities make worse  Pain/symptom associated signs/symptoms: none    Prior treatment   NSAIDsNo   Injections No not into the elbow had an injection in her wrist  Bracing/Orthotics No    Physical Therapy No     I have personally reviewed all the relevant PMH, PSH, SH, FH, Medications and allergies      PAST MEDICAL HISTORY:  History reviewed. No pertinent past medical history.    PAST SURGICAL HISTORY:  History reviewed. No pertinent surgical history.    FAMILY HISTORY:  Family History   Problem Relation Age of Onset    Hyperlipidemia Mother     No Known Problems Father     Hyperlipidemia Brother        SOCIAL HISTORY:  Social History     Tobacco Use    Smoking status: Never    Smokeless tobacco: Never   Vaping Use    Vaping status: Never Used   Substance Use Topics    Alcohol use: Never    Drug use: Never       MEDICATIONS:    Current Outpatient Medications:     levothyroxine 25 mcg tablet, Take 1 tablet (25 mcg total) by mouth daily in the early morning, Disp: 100 tablet, Rfl: 1    vitamin B-12 (CYANOCOBALAMIN) 500 MCG TABS, Take 1 tablet (500 mcg total) by mouth daily, Disp: 90 tablet, Rfl: 2    ALLERGIES:  No Known Allergies        REVIEW OF SYSTEMS:  Review of Systems   Constitutional:  Negative for chills and fever.   HENT:  Negative for drooling and sneezing.    Eyes:  Negative for redness.   Respiratory:  Negative for cough and wheezing.    Gastrointestinal:  Negative for nausea and vomiting.   Musculoskeletal:  Positive for arthralgias. Negative for joint swelling and myalgias.   Neurological:  Negative for weakness and numbness.   Psychiatric/Behavioral:  Negative for behavioral problems. The patient is not nervous/anxious.        VITALS:  Vitals:    08/08/24 1315   BP: 122/73   Pulse: 90        LABS:      _____________________________________________________  PHYSICAL EXAMINATION:  General: well developed and well nourished, alert, oriented times 3, and appears comfortable  Psychiatric: Normal  HEENT: Normocephalic, Atraumatic Trachea Midline, No torticollis  Pulmonary: No audible wheezing or respiratory distress   Abdomen/GI: Non tender, non distended   Cardiovascular: No pitting edema, 2+ radial pulse   Skin: No masses, erythema, lacerations, fluctation, ulcerations  Neurovascular: Sensation Intact to the Median, Ulnar, Radial Nerve, Motor Intact to the Median, Ulnar, Radial Nerve, and Pulses Intact  Musculoskeletal: Normal, except as noted in detailed exam and in HPI.      MUSCULOSKELETAL EXAMINATION:  Right elbow  TTP lateral epicondyle  Pain with resisted wrist extension  Full elbow extension   ___________________________________________________  STUDIES REVIEWED:  No new images reviewed in the office today.    LABS REVIEWED:    HgA1c:   Lab Results   Component Value Date    HGBA1C 5.9 (H) 05/03/2024     BMP:   Lab Results   Component Value Date    CALCIUM 9.6 05/03/2024    K 4.0 05/03/2024    CO2 27 05/03/2024     05/03/2024    BUN 11 05/03/2024    CREATININE 0.59 (L) 05/03/2024               PROCEDURES PERFORMED:  Procedures  No Procedures performed today    _____________________________________________________      Scribe Attestation      I,:  Anjali Arzola MA am acting as a scribe while in the presence of the attending physician.:       I,:  Yariel Herrmann MD personally performed the services described in this documentation    as scribed in my presence.:

## 2024-08-08 NOTE — LETTER
August 8, 2024     Patient: Reddy Houston  YOB: 1975  Date of Visit: 8/8/2024      To Whom it May Concern:    Reddy Houston is under my professional care. Reddy was seen in my office on 8/8/2024. Reddy may return to work. Please allow her to only use the small scanner. Use of the heavy scanner appears to be aggravating her right elbow lateral epicondylitis.     If you have any questions or concerns, please don't hesitate to call.         Sincerely,          Yariel Herrmann MD

## 2024-08-12 ENCOUNTER — APPOINTMENT (EMERGENCY)
Dept: RADIOLOGY | Facility: HOSPITAL | Age: 49
End: 2024-08-12
Payer: OTHER MISCELLANEOUS

## 2024-08-12 ENCOUNTER — HOSPITAL ENCOUNTER (EMERGENCY)
Facility: HOSPITAL | Age: 49
Discharge: HOME | End: 2024-08-13
Attending: EMERGENCY MEDICINE
Payer: OTHER MISCELLANEOUS

## 2024-08-12 VITALS
TEMPERATURE: 98.3 F | HEART RATE: 77 BPM | DIASTOLIC BLOOD PRESSURE: 79 MMHG | SYSTOLIC BLOOD PRESSURE: 117 MMHG | OXYGEN SATURATION: 100 % | RESPIRATION RATE: 20 BRPM

## 2024-08-12 DIAGNOSIS — M54.50 ACUTE MIDLINE LOW BACK PAIN WITHOUT SCIATICA: Primary | ICD-10-CM

## 2024-08-12 PROCEDURE — 99283 EMERGENCY DEPT VISIT LOW MDM: CPT

## 2024-08-12 PROCEDURE — 96372 THER/PROPH/DIAG INJ SC/IM: CPT

## 2024-08-12 PROCEDURE — 72100 X-RAY EXAM L-S SPINE 2/3 VWS: CPT

## 2024-08-12 RX ORDER — LIDOCAINE 50 MG/G
1 PATCH TOPICAL ONCE
Status: DISCONTINUED | OUTPATIENT
Start: 2024-08-12 | End: 2024-08-13 | Stop reason: HOSPADM

## 2024-08-12 RX ORDER — METHOCARBAMOL 500 MG/1
500 TABLET, FILM COATED ORAL ONCE
Status: COMPLETED | OUTPATIENT
Start: 2024-08-12 | End: 2024-08-12

## 2024-08-12 RX ORDER — KETOROLAC TROMETHAMINE 30 MG/ML
15 INJECTION, SOLUTION INTRAMUSCULAR; INTRAVENOUS ONCE
Status: COMPLETED | OUTPATIENT
Start: 2024-08-12 | End: 2024-08-12

## 2024-08-12 RX ORDER — ACETAMINOPHEN 325 MG/1
975 TABLET ORAL ONCE
Status: COMPLETED | OUTPATIENT
Start: 2024-08-12 | End: 2024-08-12

## 2024-08-12 RX ADMIN — LIDOCAINE 1 PATCH: 700 PATCH TOPICAL at 23:31

## 2024-08-12 RX ADMIN — KETOROLAC TROMETHAMINE 15 MG: 30 INJECTION, SOLUTION INTRAMUSCULAR; INTRAVENOUS at 23:06

## 2024-08-12 RX ADMIN — ACETAMINOPHEN 975 MG: 325 TABLET, FILM COATED ORAL at 23:06

## 2024-08-12 RX ADMIN — METHOCARBAMOL 500 MG: 500 TABLET ORAL at 23:06

## 2024-08-12 NOTE — Clinical Note
Reddy Houston was seen and treated in our emergency department on 8/12/2024.                Diagnosis:     Reddy  may return to work on return date.    She may return on this date: 08/15/2024         If you have any questions or concerns, please don't hesitate to call.      Isi Berry MD    ______________________________           _______________          _______________  Hospital Representative                              Date                                Time

## 2024-08-12 NOTE — Clinical Note
Reddy Houston was seen and treated in our emergency department on 8/12/2024.                Diagnosis:     Reddy  may return to work on return date.    She may return on this date: 08/15/2024         If you have any questions or concerns, please don't hesitate to call.      Fabian Velasquez PA-C    ______________________________           _______________          _______________  Hospital Representative                              Date                                Time

## 2024-08-13 ENCOUNTER — TELEPHONE (OUTPATIENT)
Dept: PHYSICAL THERAPY | Facility: OTHER | Age: 49
End: 2024-08-13

## 2024-08-13 ENCOUNTER — APPOINTMENT (OUTPATIENT)
Dept: URGENT CARE | Age: 49
End: 2024-08-13
Payer: OTHER MISCELLANEOUS

## 2024-08-13 DIAGNOSIS — M54.50 ACUTE MIDLINE LOW BACK PAIN WITHOUT SCIATICA: Primary | ICD-10-CM

## 2024-08-13 PROCEDURE — 99284 EMERGENCY DEPT VISIT MOD MDM: CPT | Performed by: PHYSICIAN ASSISTANT

## 2024-08-13 PROCEDURE — 99203 OFFICE O/P NEW LOW 30 MIN: CPT

## 2024-08-13 RX ORDER — METHOCARBAMOL 500 MG/1
500 TABLET, FILM COATED ORAL 4 TIMES DAILY
Qty: 20 TABLET | Refills: 0 | Status: SHIPPED | OUTPATIENT
Start: 2024-08-13 | End: 2024-08-18

## 2024-08-13 NOTE — TELEPHONE ENCOUNTER
Patient called into [facility] today and left v/m @10:32am regarding her referral.    Returned patient's call @10:45am.    Spoke with patient, explained program, protocol and reason for the call.    Patient confirmed this is a work elated injury. And she will call her job to obtain her claim info.    I explained [facility] can't triage for PT. I will enter a referral for [facility]. Also ED provided their phone number but did not enter a referral. Patient is aware she needs to have her claim nfo available before she calls [facility].    [facility] referral closed per protocol.

## 2024-08-13 NOTE — ED NOTES
Pt tolerated ambulatory trial well. Minor complaint of pain. Steady gait noted.     Valentina Manuel RN  08/13/24 0021

## 2024-08-13 NOTE — ED PROVIDER NOTES
"History  Chief Complaint   Patient presents with    Back Pain     Pt reports she was lifting something at work and started with lower back pain. Denies numbness, tingling, or loss of bowel or bladder function      Patient is a 49-year-old female with no significant past medical history with surgical history of  section that presents to the emergency department with dull persistent nonradiating lower back pain symptoms for approximately 4 hours.  Patient denies associated symptoms.  Patient states that she works at [employer] and when \"I was lifting a 42 pound box, I felt a sharp pain in my lower back.\"  Patient states that she has no history of back injury to date.  Patient denies saddle anesthesias and bowel bladder retention.  Patient denies palliative factors with provocative factors of pressure to lower back.  Patient has not effective treatment.  Patient denies fevers, chills, nausea, vomiting, diarrhea, constipation and urinary symptoms.  Patient has numbness, tingling and loss of power in any or all extremities.  Patient denies chest pain, shortness of breath, and abdominal pain.      History provided by:  Patient   used: No    Back Pain  Associated symptoms: no abdominal pain, no chest pain, no dysuria, no fever, no headaches, no numbness and no weakness        Prior to Admission Medications   Prescriptions Last Dose Informant Patient Reported? Taking?   levothyroxine 25 mcg tablet   No No   Sig: Take 1 tablet (25 mcg total) by mouth daily in the early morning   meloxicam (Mobic) 7.5 mg tablet   No No   Sig: Take 1 tablet (7.5 mg total) by mouth daily   vitamin B-12 (CYANOCOBALAMIN) 500 MCG TABS   No No   Sig: Take 1 tablet (500 mcg total) by mouth daily      Facility-Administered Medications: None       History reviewed. No pertinent past medical history.    Past Surgical History:   Procedure Laterality Date     SECTION         Family History   Problem Relation Age of Onset "    Hyperlipidemia Mother     No Known Problems Father     Hyperlipidemia Brother      I have reviewed and agree with the history as documented.    E-Cigarette/Vaping    E-Cigarette Use Never User      E-Cigarette/Vaping Substances     Social History     Tobacco Use    Smoking status: Never    Smokeless tobacco: Never   Vaping Use    Vaping status: Never Used   Substance Use Topics    Alcohol use: Never    Drug use: Never       Review of Systems   Constitutional:  Negative for activity change, appetite change, chills and fever.   HENT:  Negative for congestion, postnasal drip, rhinorrhea, sinus pressure, sinus pain, sore throat and tinnitus.    Eyes:  Negative for photophobia and visual disturbance.   Respiratory:  Negative for cough, chest tightness and shortness of breath.    Cardiovascular:  Negative for chest pain and palpitations.   Gastrointestinal:  Negative for abdominal pain, constipation, diarrhea, nausea and vomiting.   Genitourinary:  Negative for difficulty urinating, dysuria, flank pain, frequency and urgency.   Musculoskeletal:  Positive for back pain. Negative for gait problem, neck pain and neck stiffness.   Skin:  Negative for pallor and rash.   Allergic/Immunologic: Negative for environmental allergies and food allergies.   Neurological:  Negative for dizziness, weakness, numbness and headaches.   Psychiatric/Behavioral:  Negative for confusion.    All other systems reviewed and are negative.      Physical Exam  Physical Exam  Vitals and nursing note reviewed.   Constitutional:       General: She is awake. She is not in acute distress.     Appearance: Normal appearance. She is well-developed and normal weight. She is not ill-appearing, toxic-appearing or diaphoretic.      Comments: /79 (BP Location: Right arm)   Pulse 77   Temp 98.3 °F (36.8 °C) (Oral)   Resp 20   SpO2 100%      HENT:      Head: Normocephalic and atraumatic.      Jaw: There is normal jaw occlusion.      Right Ear:  Hearing, tympanic membrane and external ear normal. No decreased hearing noted. No drainage, swelling or tenderness. No mastoid tenderness.      Left Ear: Hearing, tympanic membrane and external ear normal. No decreased hearing noted. No drainage, swelling or tenderness. No mastoid tenderness.      Nose: Nose normal.      Mouth/Throat:      Lips: Pink.      Mouth: Mucous membranes are moist.      Pharynx: Oropharynx is clear. Uvula midline.   Eyes:      General: Lids are normal. Vision grossly intact. Gaze aligned appropriately.         Right eye: No discharge.         Left eye: No discharge.      Extraocular Movements: Extraocular movements intact.      Conjunctiva/sclera: Conjunctivae normal.      Pupils: Pupils are equal, round, and reactive to light.   Neck:      Vascular: No JVD.      Trachea: Trachea and phonation normal. No tracheal tenderness or tracheal deviation.   Cardiovascular:      Rate and Rhythm: Normal rate and regular rhythm.      Pulses: Normal pulses.           Radial pulses are 2+ on the right side and 2+ on the left side.        Posterior tibial pulses are 2+ on the right side and 2+ on the left side.      Heart sounds: Normal heart sounds. No murmur heard.  Pulmonary:      Effort: Pulmonary effort is normal. No respiratory distress.      Breath sounds: Normal breath sounds and air entry. No stridor. No decreased breath sounds, wheezing, rhonchi or rales.   Chest:      Chest wall: No tenderness.   Abdominal:      General: Abdomen is flat. Bowel sounds are normal. There is no distension.      Palpations: Abdomen is soft. Abdomen is not rigid.      Tenderness: There is no abdominal tenderness. There is no guarding or rebound.   Musculoskeletal:         General: No swelling. Normal range of motion.      Cervical back: Normal, full passive range of motion without pain, normal range of motion and neck supple. No rigidity. No spinous process tenderness or muscular tenderness. Normal range of motion.       Thoracic back: Normal.      Lumbar back: Spasms and tenderness present.        Back:         Legs:       Comments: Passive ROM intact  Upper and lower extremity 5/5 bilaterally  Neurovascularly intact  No grinding or clicking of joints       Feet:      Right foot:      Toenail Condition: Right toenails are normal.      Left foot:      Toenail Condition: Left toenails are normal.   Lymphadenopathy:      Head:      Right side of head: No submental, submandibular, tonsillar, preauricular, posterior auricular or occipital adenopathy.      Left side of head: No submental, submandibular, tonsillar, preauricular, posterior auricular or occipital adenopathy.      Cervical: No cervical adenopathy.      Right cervical: No superficial, deep or posterior cervical adenopathy.     Left cervical: No superficial, deep or posterior cervical adenopathy.   Skin:     General: Skin is warm and dry.      Capillary Refill: Capillary refill takes less than 2 seconds.      Findings: No rash.   Neurological:      General: No focal deficit present.      Mental Status: She is alert and oriented to person, place, and time. Mental status is at baseline.      GCS: GCS eye subscore is 4. GCS verbal subscore is 5. GCS motor subscore is 6.      Sensory: No sensory deficit.      Deep Tendon Reflexes: Reflexes are normal and symmetric.      Reflex Scores:       Patellar reflexes are 2+ on the right side and 2+ on the left side.  Psychiatric:         Attention and Perception: Attention normal.         Mood and Affect: Mood normal.         Speech: Speech normal.         Behavior: Behavior normal. Behavior is cooperative.         Thought Content: Thought content normal.         Judgment: Judgment normal.         Vital Signs  ED Triage Vitals [08/12/24 2124]   Temperature Pulse Respirations Blood Pressure SpO2   98.3 °F (36.8 °C) 77 20 117/79 100 %      Temp Source Heart Rate Source Patient Position - Orthostatic VS BP Location FiO2 (%)   Oral  Monitor Sitting Right arm --      Pain Score       --           Vitals:    24   BP: 117/79   Pulse: 77   Patient Position - Orthostatic VS: Sitting         Visual Acuity      ED Medications  Medications   ketorolac (TORADOL) injection 15 mg (15 mg Intramuscular Given 24)   acetaminophen (TYLENOL) tablet 975 mg (975 mg Oral Given 24)   methocarbamol (ROBAXIN) tablet 500 mg (500 mg Oral Given 24)       Diagnostic Studies  Results Reviewed       None                   XR spine lumbar 2 or 3 views injury   ED Interpretation by Fabian Velasquez PA-C ([id number])   No acute osseous abnormality on initial read by me.                 Procedures  Procedures         ED Course  ED Course as of 24 0750   Mon Aug 12, 2024   2359 Patient has no red flag symptoms suggesting cauda equina syndrome.   Tue Aug 13, 2024   0021 Pt with successful                                  SBIRT 22yo+      Flowsheet Row Most Recent Value   Initial Alcohol Screen: US AUDIT-C     1. How often do you have a drink containing alcohol? 0 Filed at: 2024   2. How many drinks containing alcohol do you have on a typical day you are drinking?  0 Filed at: 2024   3a. Male UNDER 65: How often do you have five or more drinks on one occasion? 0 Filed at: 2024   3b. FEMALE Any Age, or MALE 65+: How often do you have 4 or more drinks on one occassion? 0 Filed at: 2024   Audit-C Score 0 Filed at: 2024   ROHAN: How many times in the past year have you...    Used an illegal drug or used a prescription medication for non-medical reasons? Never Filed at: 2024                      Medical Decision Making  Patient is a 49-year-old female with no significant past medical history with surgical history of  section that presents to the emergency department with dull persistent nonradiating lower back pain symptoms for approximately 4 hours.   Patient denies  saddle anesthesias, bowel and bladder retention-doubt cauda equina syndrome; no Meningeal signs  No focal neurological signs; strength bilateral lower extremity 5/5  Delivered Multimodal pain control emergency department; patient verbalizes decrease in back pain status post medication delivery  Prescribed Robaxin and counseled patient on medication administration and side effects  Lumbar xray with no acute osseous abnormality on initial read  Follow-up with [facility]  Follow-up with PCP  Follow up with emergency department symptoms persist or exacerbate  Patient verbalized understanding of all clinical imaging findings, discharge instructions, follow-up, and verbalized agreement with current treatment plan.      Amount and/or Complexity of Data Reviewed  Radiology: ordered and independent interpretation performed.    Risk  OTC drugs.  Prescription drug management.                 Disposition  Final diagnoses:   Acute midline low back pain without sciatica     Time reflects when diagnosis was documented in both MDM as applicable and the Disposition within this note       Time User Action Codes Description Comment    8/13/2024 12:24 AM Fabian Velasquez Add [M54.50] Acute midline low back pain without sciatica           ED Disposition       ED Disposition   Discharge    Condition   Stable    Date/Time   Tue Aug 13, 2024 0023    Comment   Reddy Houston discharge to home/self care.                   Follow-up Information       Follow up With Specialties Details Why Contact Info Additional Information    Darlyn Andrew MD Family Medicine   [address] [facility] [address]  [phone]       [facility] Emergency Department Emergency Medicine   [address] [facility] [id number] [phone] [facility] Emergency Department, [address] [place], [place], [address]    [facility] Physical Therapy   [phone]  [phone]    [facility] Occupational Medicine [place] [address]  [phone]             Discharge Medication List as of 8/13/2024 12:25 AM        START taking these medications    Details   methocarbamol (ROBAXIN) 500 mg tablet Take 1 tablet (500 mg total) by mouth 4 (four) times a day for 5 days, Starting Tue 8/13/2024, Until Sun 8/18/2024, Normal           CONTINUE these medications which have NOT CHANGED    Details   levothyroxine 25 mcg tablet Take 1 tablet (25 mcg total) by mouth daily in the early morning, Starting Tue 7/16/2024, Normal      meloxicam (Mobic) 7.5 mg tablet Take 1 tablet (7.5 mg total) by mouth daily, Starting Thu 8/8/2024, Normal      vitamin B-12 (CYANOCOBALAMIN) 500 MCG TABS Take 1 tablet (500 mcg total) by mouth daily, Starting Tue 5/21/2024, Normal                 PDMP Review       None            ED Provider  Electronically Signed by             Fabian Velasquez PA-C  08/13/24 0758

## 2024-08-19 ENCOUNTER — APPOINTMENT (OUTPATIENT)
Dept: URGENT CARE | Age: 49
End: 2024-08-19
Payer: OTHER MISCELLANEOUS

## 2024-08-19 PROCEDURE — 99213 OFFICE O/P EST LOW 20 MIN: CPT

## 2024-08-22 ENCOUNTER — OFFICE VISIT (OUTPATIENT)
Dept: OBGYN CLINIC | Facility: CLINIC | Age: 49
End: 2024-08-22
Payer: COMMERCIAL

## 2024-08-22 VITALS
HEIGHT: 63 IN | BODY MASS INDEX: 24.45 KG/M2 | DIASTOLIC BLOOD PRESSURE: 70 MMHG | WEIGHT: 138 LBS | SYSTOLIC BLOOD PRESSURE: 98 MMHG

## 2024-08-22 DIAGNOSIS — Z12.11 SCREEN FOR COLON CANCER: ICD-10-CM

## 2024-08-22 DIAGNOSIS — Z11.51 SCREENING FOR HPV (HUMAN PAPILLOMAVIRUS): ICD-10-CM

## 2024-08-22 DIAGNOSIS — Z01.419 WELL WOMAN EXAM WITH ROUTINE GYNECOLOGICAL EXAM: Primary | ICD-10-CM

## 2024-08-22 DIAGNOSIS — Z12.4 ENCOUNTER FOR SCREENING FOR MALIGNANT NEOPLASM OF CERVIX: ICD-10-CM

## 2024-08-22 DIAGNOSIS — Z12.4 CERVICAL CANCER SCREENING: ICD-10-CM

## 2024-08-22 PROCEDURE — G0476 HPV COMBO ASSAY CA SCREEN: HCPCS | Performed by: OBSTETRICS & GYNECOLOGY

## 2024-08-22 PROCEDURE — G0145 SCR C/V CYTO,THINLAYER,RESCR: HCPCS | Performed by: OBSTETRICS & GYNECOLOGY

## 2024-08-22 PROCEDURE — 99386 PREV VISIT NEW AGE 40-64: CPT | Performed by: OBSTETRICS & GYNECOLOGY

## 2024-08-22 NOTE — PROGRESS NOTES
ASSESSMENT & PLAN:   Diagnoses and all orders for this visit:    Well woman exam with routine gynecological exam  -     Liquid-based pap, screening    Encounter for screening for malignant neoplasm of cervix  -     Liquid-based pap, screening    Screening for HPV (human papillomavirus)  -     Liquid-based pap, screening    Screen for colon cancer  -     Ambulatory Referral to Gastroenterology; Future    Cervical cancer screening  -     Ambulatory Referral to Obstetrics / Gynecology          The following were reviewed in today's visit: ASCCP guidelines, Gardisil vaccination, STD testing breast self exam, mammography screening ordered, menopause, exercise, healthy diet, and colonoscopy discussed and ordered.    Patient to return to office in yearly for annual exam.     All questions have been answered to her satisfaction.        CC:  Annual Gynecologic Examination  Chief Complaint   Patient presents with    Gynecologic Exam     Annual exam, pap indicated. Pt c/o pain during sexual intercourse.   Tubal ligation   No pap hx on file  Mammo scheduled on 2024       HPI: Reddy Houston is a 49 y.o.  who presents for annual gynecologic examination.  She has the following concerns:    - new patient. Establish gyn care. Denies any prior gyn care. Denies any prior pap smears.     Reports prior obstetric care with Dr. Cardoso. Reports that they did her tubal ligation in . After delivery of her children she did not return for gynecologic care.     After menopause, she is having vaginal dryness that can cause pain with intercourse. She is sexually active sporadically. She reports that her  does not pressure her to be more sexually active. She has some desire to have intercourse but the thought that it will be painful causes her to stop.       Health Maintenance:    Exercise:  walking and lifting heavy at work, [employer]  Breast exams/breast awareness: yes  Last mammogram: . Mammo ordered  Colorectal  cancer screening: none    Past Medical History:   Diagnosis Date    No pertinent past medical history        Past Surgical History:   Procedure Laterality Date     SECTION      TUBAL LIGATION      -Dr. George       Past OB/Gyn History:   Patient's last menstrual period was 2023 (approximate).    Menopausal status: postmenopausal  Menopausal symptoms: vaginal dryness, dyspareunia    Last Pap: not available to review. Reports no history of pap smear.   History of abnormal Pap smear: no    Patient is currently sexually active.   STD testing: no  Current contraception: post menopausal status and tubal ligation      Family History  Family History   Problem Relation Age of Onset    Hyperlipidemia Mother     No Known Problems Father     Hyperlipidemia Brother     No Known Problems Son     No Known Problems Son        Family history of uterine or ovarian cancer: no  Family history of breast cancer: no  Family history of colon cancer: no    Social History:  Social History     Socioeconomic History    Marital status: /Civil Union     Spouse name: Not on file    Number of children: Not on file    Years of education: Not on file    Highest education level: Not on file   Occupational History    Not on file   Tobacco Use    Smoking status: Never    Smokeless tobacco: Never   Vaping Use    Vaping status: Never Used   Substance and Sexual Activity    Alcohol use: Never    Drug use: Never    Sexual activity: Yes     Partners: Male     Birth control/protection: None, Female Sterilization     Comment:  20 yrs - Tubal ligation   Other Topics Concern    Not on file   Social History Narrative    Not on file     Social Determinants of Health     Financial Resource Strain: Not on file   Food Insecurity: Not on file   Transportation Needs: Not on file   Physical Activity: Not on file   Stress: Not on file   Social Connections: Not on file   Intimate Partner Violence: Not on file   Housing Stability: Not on  "file     Domestic violence screen: negative    Allergies:  No Known Allergies    Medications:    Current Outpatient Medications:     meloxicam (Mobic) 7.5 mg tablet, Take 1 tablet (7.5 mg total) by mouth daily, Disp: 30 tablet, Rfl: 1    vitamin B-12 (CYANOCOBALAMIN) 500 MCG TABS, Take 1 tablet (500 mcg total) by mouth daily, Disp: 90 tablet, Rfl: 2    levothyroxine 25 mcg tablet, Take 1 tablet (25 mcg total) by mouth daily in the early morning (Patient not taking: Reported on 8/22/2024), Disp: 100 tablet, Rfl: 1    methocarbamol (ROBAXIN) 500 mg tablet, Take 1 tablet (500 mg total) by mouth 4 (four) times a day for 5 days (Patient not taking: Reported on 8/22/2024), Disp: 20 tablet, Rfl: 0    Review of Systems:  Review of Systems   Constitutional:  Negative for activity change, appetite change and unexpected weight change.   Respiratory:  Negative for cough and shortness of breath.    Cardiovascular:  Negative for chest pain.   Gastrointestinal:  Negative for abdominal pain, constipation, diarrhea, nausea and vomiting.   Genitourinary:  Negative for difficulty urinating, dyspareunia, frequency, menstrual problem, pelvic pain, urgency, vaginal bleeding, vaginal discharge and vaginal pain.   Musculoskeletal:  Negative for back pain.   Skin: Negative.    Neurological:  Negative for dizziness, weakness, light-headedness and headaches.   Psychiatric/Behavioral: Negative.           Physical Exam:  BP 98/70 (BP Location: Left arm, Patient Position: Sitting, Cuff Size: Standard)   Ht 5' 3\" (1.6 m)   Wt 62.6 kg (138 lb)   LMP 01/01/2023 (Approximate)   BMI 24.45 kg/m²    Physical Exam  Constitutional:       General: She is not in acute distress.     Appearance: Normal appearance. She is well-developed and normal weight. She is not diaphoretic.   Genitourinary:      Vulva and bladder normal.      No lesions in the vagina.      Genitourinary Comments: Perineum normal in appearance, no lacerations, no ulcerations, no " lesions visualized.      Right Labia: No rash, tenderness or lesions.     Left Labia: No tenderness, lesions or rash.     No inguinal adenopathy present in the right or left side.     No vaginal discharge, erythema, tenderness or bleeding.      No vaginal prolapse present.     No vaginal atrophy present.       Right Adnexa: not tender, not full and no mass present.     Left Adnexa: not tender, not full and no mass present.     Cervix is nulliparous.      No cervical motion tenderness, discharge, friability, lesion or polyp.      No parametrium nodularity or thickening present.     Uterus is not enlarged, tender or prolapsed.      No uterine mass detected.     Uterus is anteverted.      No urethral prolapse or mass present.      Bladder is not tender.       Pelvic exam was performed with patient in the lithotomy position.   Rectum:      No tenderness or external hemorrhoid.   Breasts:     Breasts are symmetrical.      Right: No swelling, bleeding, mass, skin change or tenderness.      Left: No swelling, bleeding, mass, skin change or tenderness.   HENT:      Head: Normocephalic and atraumatic.   Neck:      Thyroid: No thyromegaly or thyroid tenderness.   Cardiovascular:      Rate and Rhythm: Normal rate and regular rhythm.      Heart sounds: Normal heart sounds. No murmur heard.     No friction rub.   Pulmonary:      Effort: Pulmonary effort is normal. No respiratory distress.      Breath sounds: Normal breath sounds. No wheezing or rales.   Abdominal:      Palpations: Abdomen is soft. There is no mass.      Tenderness: There is no abdominal tenderness. There is no guarding.   Musculoskeletal:         General: No tenderness. Normal range of motion.      Right lower leg: No edema.      Left lower leg: No edema.   Lymphadenopathy:      Lower Body: No right inguinal adenopathy. No left inguinal adenopathy.   Neurological:      Mental Status: She is alert and oriented to person, place, and time.   Skin:     General: Skin  is warm and dry.      Coloration: Skin is not pale.      Findings: No erythema.   Psychiatric:         Mood and Affect: Mood normal.         Behavior: Behavior normal.         Thought Content: Thought content normal.         Judgment: Judgment normal.   Vitals and nursing note reviewed.

## 2024-08-26 LAB
HPV HR 12 DNA CVX QL NAA+PROBE: NEGATIVE
HPV16 DNA CVX QL NAA+PROBE: NEGATIVE
HPV18 DNA CVX QL NAA+PROBE: NEGATIVE

## 2024-08-28 ENCOUNTER — APPOINTMENT (OUTPATIENT)
Dept: RADIOLOGY | Age: 49
End: 2024-08-28
Payer: COMMERCIAL

## 2024-08-28 ENCOUNTER — APPOINTMENT (OUTPATIENT)
Dept: URGENT CARE | Age: 49
End: 2024-08-28
Payer: OTHER MISCELLANEOUS

## 2024-08-28 DIAGNOSIS — M54.50 ACUTE MIDLINE LOW BACK PAIN WITHOUT SCIATICA: Primary | ICD-10-CM

## 2024-08-28 DIAGNOSIS — M54.50 ACUTE MIDLINE LOW BACK PAIN WITHOUT SCIATICA: ICD-10-CM

## 2024-08-28 LAB
LAB AP GYN PRIMARY INTERPRETATION: NORMAL
Lab: NORMAL

## 2024-08-28 PROCEDURE — 99213 OFFICE O/P EST LOW 20 MIN: CPT

## 2024-08-28 PROCEDURE — 72100 X-RAY EXAM L-S SPINE 2/3 VWS: CPT

## 2024-09-18 ENCOUNTER — HOSPITAL ENCOUNTER (OUTPATIENT)
Facility: HOSPITAL | Age: 49
Discharge: HOME | End: 2024-09-18
Payer: COMMERCIAL

## 2024-09-18 VITALS — BODY MASS INDEX: 24.45 KG/M2 | WEIGHT: 138 LBS | HEIGHT: 63 IN

## 2024-09-18 DIAGNOSIS — R92.8 ABNORMAL MAMMOGRAM: Primary | ICD-10-CM

## 2024-09-18 DIAGNOSIS — Z12.31 ENCOUNTER FOR SCREENING MAMMOGRAM FOR BREAST CANCER: ICD-10-CM

## 2024-09-18 PROCEDURE — 77067 SCR MAMMO BI INCL CAD: CPT

## 2024-09-18 PROCEDURE — 77063 BREAST TOMOSYNTHESIS BI: CPT

## 2024-09-20 ENCOUNTER — APPOINTMENT (OUTPATIENT)
Age: 49
End: 2024-09-20
Payer: OTHER MISCELLANEOUS

## 2024-09-20 PROCEDURE — 99214 OFFICE O/P EST MOD 30 MIN: CPT | Performed by: STUDENT IN AN ORGANIZED HEALTH CARE EDUCATION/TRAINING PROGRAM

## 2024-09-26 ENCOUNTER — HOSPITAL ENCOUNTER (OUTPATIENT)
Dept: ULTRASOUND IMAGING | Facility: CLINIC | Age: 49
Discharge: HOME | End: 2024-09-26
Payer: COMMERCIAL

## 2024-09-26 ENCOUNTER — HOSPITAL ENCOUNTER (OUTPATIENT)
Dept: MAMMOGRAPHY | Facility: CLINIC | Age: 49
Discharge: HOME | End: 2024-09-26
Payer: COMMERCIAL

## 2024-09-26 DIAGNOSIS — R92.8 ABNORMAL SCREENING MAMMOGRAM: ICD-10-CM

## 2024-09-26 PROCEDURE — 76642 ULTRASOUND BREAST LIMITED: CPT

## 2024-09-26 PROCEDURE — 77066 DX MAMMO INCL CAD BI: CPT

## 2024-09-26 RX ADMIN — IOHEXOL 94 ML: 350 INJECTION, SOLUTION INTRAVENOUS at 09:30

## 2024-10-10 ENCOUNTER — OFFICE VISIT (OUTPATIENT)
Dept: FAMILY MEDICINE CLINIC | Facility: CLINIC | Age: 49
End: 2024-10-10

## 2024-10-10 ENCOUNTER — APPOINTMENT (OUTPATIENT)
Dept: RADIOLOGY | Facility: CLINIC | Age: 49
End: 2024-10-10
Payer: COMMERCIAL

## 2024-10-10 VITALS
HEART RATE: 96 BPM | OXYGEN SATURATION: 99 % | DIASTOLIC BLOOD PRESSURE: 79 MMHG | WEIGHT: 139 LBS | HEIGHT: 63 IN | SYSTOLIC BLOOD PRESSURE: 122 MMHG | BODY MASS INDEX: 24.63 KG/M2

## 2024-10-10 DIAGNOSIS — M25.562 ACUTE PAIN OF LEFT KNEE: ICD-10-CM

## 2024-10-10 DIAGNOSIS — M25.562 ACUTE PAIN OF LEFT KNEE: Primary | ICD-10-CM

## 2024-10-10 PROCEDURE — 73562 X-RAY EXAM OF KNEE 3: CPT

## 2024-10-10 NOTE — ASSESSMENT & PLAN NOTE
Sudden onset of symptoms.  Advised x-ray of the knee.  Patient advised to restart naproxen since it helped improve the symptoms.  Will need Ortho evaluation if symptoms do not improve.

## 2024-10-10 NOTE — PROGRESS NOTES
Subjective:      Patient ID: Reddy Houston is a 49 y.o. female.    HPI    Patient is here reporting pain on the inner aspect of the left knee with started 2 weeks ago.  Patient denies any injury trauma or fall.  Symptoms started spontaneously and have gradually progressed to the point where she is reporting extreme tenderness, deep pain and difficulty walking.   Patient has not noted any swelling in the area.  Patient tried naproxen twice and it helped improve her symptoms.  Symptoms recurred when she discontinued the naproxen.  Renal function, GFR reviewed.    Past Medical History:   Diagnosis Date    No pertinent past medical history        Family History   Problem Relation Age of Onset    Hyperlipidemia Mother     Heart attack Mother     Hyperlipidemia Brother     No Known Problems Son     No Known Problems Son     Heart attack Maternal Aunt     No Known Problems Paternal Aunt        Past Surgical History:   Procedure Laterality Date     SECTION      TUBAL LIGATION      -Dr. George        reports that she has never smoked. She has never used smokeless tobacco. She reports that she does not drink alcohol and does not use drugs.      Current Outpatient Medications:     levothyroxine 25 mcg tablet, Take 1 tablet (25 mcg total) by mouth daily in the early morning (Patient not taking: Reported on 2024), Disp: 100 tablet, Rfl: 1    meloxicam (Mobic) 7.5 mg tablet, Take 1 tablet (7.5 mg total) by mouth daily (Patient not taking: Reported on 10/10/2024), Disp: 30 tablet, Rfl: 1    methocarbamol (ROBAXIN) 500 mg tablet, Take 1 tablet (500 mg total) by mouth 4 (four) times a day for 5 days (Patient not taking: Reported on 2024), Disp: 20 tablet, Rfl: 0    vitamin B-12 (CYANOCOBALAMIN) 500 MCG TABS, Take 1 tablet (500 mcg total) by mouth daily (Patient not taking: Reported on 10/10/2024), Disp: 90 tablet, Rfl: 2    The following portions of the patient's history were reviewed and updated as  "appropriate: allergies, current medications, past family history, past medical history, past social history, past surgical history and problem list.    Review of Systems   Musculoskeletal:         Left knee pain           Objective:    /79   Pulse 96   Ht 5' 3\" (1.6 m)   Wt 63 kg (139 lb)   LMP 01/01/2023 (Approximate)   SpO2 99%   BMI 24.62 kg/m²      Physical Exam  Musculoskeletal:         General: Tenderness present.        Legs:       Comments: Tenderness over medial aspect of the left knee           No results found for this or any previous visit (from the past 1008 hour(s)).    Assessment/Plan:    No problem-specific Assessment & Plan notes found for this encounter.           Problem List Items Addressed This Visit          Surgery/Wound/Pain    Acute pain of left knee - Primary     Sudden onset of symptoms.  Advised x-ray of the knee.  Patient advised to restart naproxen since it helped improve the symptoms.  Will need Ortho evaluation if symptoms do not improve.            "

## 2024-10-14 ENCOUNTER — TELEPHONE (OUTPATIENT)
Dept: FAMILY MEDICINE CLINIC | Facility: CLINIC | Age: 49
End: 2024-10-14

## 2024-10-14 DIAGNOSIS — M25.562 ACUTE PAIN OF LEFT KNEE: Primary | ICD-10-CM

## 2024-10-14 NOTE — TELEPHONE ENCOUNTER
----- Message from Cierra Borja MD sent at 10/12/2024 12:47 PM EDT -----  Please call patient with normal results.  Symptoms are likely muscular. I would recommend orthopedic evaluation if symptoms are persisting.

## 2024-10-17 ENCOUNTER — OFFICE VISIT (OUTPATIENT)
Dept: OBGYN CLINIC | Facility: CLINIC | Age: 49
End: 2024-10-17
Payer: COMMERCIAL

## 2024-10-17 VITALS — HEIGHT: 63 IN | WEIGHT: 138.6 LBS | BODY MASS INDEX: 24.56 KG/M2

## 2024-10-17 DIAGNOSIS — M25.562 ACUTE PAIN OF LEFT KNEE: ICD-10-CM

## 2024-10-17 DIAGNOSIS — S83.242A OTHER TEAR OF MEDIAL MENISCUS, CURRENT INJURY, LEFT KNEE, INITIAL ENCOUNTER: Primary | ICD-10-CM

## 2024-10-17 PROCEDURE — 99213 OFFICE O/P EST LOW 20 MIN: CPT | Performed by: STUDENT IN AN ORGANIZED HEALTH CARE EDUCATION/TRAINING PROGRAM

## 2024-10-17 RX ORDER — NAPROXEN 250 MG/1
250 TABLET ORAL 2 TIMES DAILY WITH MEALS
COMMUNITY

## 2024-10-17 RX ORDER — MELOXICAM 15 MG/1
15 TABLET ORAL DAILY
Qty: 30 TABLET | Refills: 0 | Status: SHIPPED | OUTPATIENT
Start: 2024-10-17

## 2024-10-17 NOTE — PROGRESS NOTES
Assesment:   49 y.o. female with left knee likely medial meniscus tear.    Plan:    Reddy Houston is a 49 y.o. female with left knee pain and swelling concerning for a tear of the medial meniscus. I explained this likely diagnosis to the patient in detail. In order to confirm his diagnosis I would like to obtain an MRI of the left knee. She will return for review of its findings and discussion of our treatment options.  She is having significant symptoms which are affecting her function and quality of life and impairing her ability to work, and so I would like to obtain an MRI to further evaluate.    Assessment & Plan  Acute pain of left knee    Orders:    Ambulatory Referral to Orthopedic Surgery    Other tear of medial meniscus, current injury, left knee, initial encounter    Orders:    MRI knee left  wo contrast; Future    meloxicam (Mobic) 15 mg tablet; Take 1 tablet (15 mg total) by mouth daily      No chief complaint on file.      Initial Office Visit    CC: left knee pain    History:       Reddy Houston is a 49 y.o. female who presents to the office for evaluation of her left knee.  Per patient report, she has had several weeks of knee pain and swelling.  The symptoms date back to a twisting injury.   Following the injury, the patient began experiencing activity related pain in the region of the medial joint line, especially with flexion type activities.  The pain is associated with swelling and mechanical symptoms in the form of catching and intermittent locking episodes.  The pain and swelling has persisted despite a trial of non-steroidal anti-inflammatory medication and a home exercise program.  The symptoms are impacting the patient's ability to participate in recreational activities and affecting her function and quality of life.     Pain Assessment:  Character: Sharp  Location: left knee  Duration: several weeks  Intensity: 10/10   Associated Symptoms:  Knee swelling    PMHx:   Past Medical History:    Diagnosis Date    No pertinent past medical history      PSHx:   Past Surgical History:   Procedure Laterality Date     SECTION      TUBAL LIGATION      -Dr. George     Medications:   Current Outpatient Medications on File Prior to Visit   Medication Sig Dispense Refill    naproxen (NAPROSYN) 250 mg tablet Take 250 mg by mouth 2 (two) times a day with meals      levothyroxine 25 mcg tablet Take 1 tablet (25 mcg total) by mouth daily in the early morning (Patient not taking: Reported on 2024) 100 tablet 1    meloxicam (Mobic) 7.5 mg tablet Take 1 tablet (7.5 mg total) by mouth daily (Patient not taking: Reported on 10/10/2024) 30 tablet 1    methocarbamol (ROBAXIN) 500 mg tablet Take 1 tablet (500 mg total) by mouth 4 (four) times a day for 5 days (Patient not taking: Reported on 2024) 20 tablet 0    vitamin B-12 (CYANOCOBALAMIN) 500 MCG TABS Take 1 tablet (500 mcg total) by mouth daily (Patient not taking: Reported on 10/10/2024) 90 tablet 2     No current facility-administered medications on file prior to visit.     Allergies: No Known Allergies  Social History: Employed at [employer] , works in processing  Family History:   Family History   Problem Relation Age of Onset    Hyperlipidemia Mother     Heart attack Mother     Hyperlipidemia Brother     No Known Problems Son     No Known Problems Son     Heart attack Maternal Aunt     No Known Problems Paternal Aunt      Review of systems: ROS is negative other than that noted in the HPI.  Constitutional: Negative for fatigue and fever.   HENT: Negative for sore throat.    Respiratory: Negative for shortness of breath.    Cardiovascular: Negative for chest pain.   Gastrointestinal: Negative for abdominal pain.   Endocrine: Negative for cold intolerance and heat intolerance.   Genitourinary: Negative for flank pain.   Musculoskeletal: Negative for back pain.   Skin: Negative for rash.   Allergic/Immunologic: Negative for immunocompromised state.    Neurological: Negative for dizziness.   Psychiatric/Behavioral: Negative for agitation.       Comprehensive Physical Examination:  There were no vitals filed for this visit.    General Appearance: The patient is a well developed, well nourished female  in no apparent distress.  Orientation:  The patient is alert and interactive.  Oriented to time, place and person.  Mood and Affect:  The patient has normal mood and affect.  Gait and Station:  She is noted to ambulate with a mildly antalgic gait favoring the left lower extremity.  Observed standing alignment demonstrates normal physiologic valgus present.      Knee Exam (focused):                RIGHT LEFT   ROM:   0-130 0-130   Crepitus  Negative Negative   Palpation: Effusion negative mild     MJL tenderness Negative Positive     LJL tenderness Negative Negative   Meniscus: Tiffany Negative Positive    Apley's Compression Negative Positive   Instability: Varus at 0 + 30 stable stable     Valgus at 0 + 30 stable stable   Special Tests: Lachman Negative Negative     Posterior drawer Negative Negative     Anterior drawer Negative Negative     Pivot shift not tested not tested     Dial not tested not tested   Patella: Grind test Negative Negative     Mobility 1/4 1/4     Apprehension Negative Negative   Other: Single leg 1/4 squat not tested not tested      LE NV Exam: +2 DP/PT pulses bilaterally  Sensation intact to light touch L2-S1 bilaterally     Bilateral hip ROM demonstrates no pain actively or passively    No calf tenderness to palpation bilaterally      Radiographic Imaging:          I have personally reviewed and interpreted 4 radiographs of the bilateral knees including bilateral weightbearing AP, weightbearing PA flexion, sunrise, and weightbearing lateral of the affected knee which were reviewed in detail with the patient. The knee is in slight valgus alignment.  The joint spaces of the medial and lateral compartments show no significant degenerative  changes.  On the sunrise view, the patellofemoral compartment shows no degenerative changes. No acute fracture or dislocation. No other bony pathology is present.

## 2024-10-17 NOTE — PATIENT INSTRUCTIONS
Take meloxicam 15 mg daily with food or water as needed for pain / inflammation  Take tylenol 1000 mg every 8 hours as needed for pain  Follow-up in 6 weeks

## 2024-10-31 ENCOUNTER — HOSPITAL ENCOUNTER (OUTPATIENT)
Dept: MRI IMAGING | Facility: HOSPITAL | Age: 49
End: 2024-10-31
Attending: STUDENT IN AN ORGANIZED HEALTH CARE EDUCATION/TRAINING PROGRAM
Payer: COMMERCIAL

## 2024-10-31 DIAGNOSIS — S83.242A OTHER TEAR OF MEDIAL MENISCUS, CURRENT INJURY, LEFT KNEE, INITIAL ENCOUNTER: ICD-10-CM

## 2024-10-31 PROCEDURE — 73721 MRI JNT OF LWR EXTRE W/O DYE: CPT

## 2024-11-08 ENCOUNTER — OFFICE VISIT (OUTPATIENT)
Dept: OBGYN CLINIC | Facility: CLINIC | Age: 49
End: 2024-11-08
Payer: COMMERCIAL

## 2024-11-08 VITALS — WEIGHT: 138 LBS | HEIGHT: 63 IN | BODY MASS INDEX: 24.45 KG/M2

## 2024-11-08 DIAGNOSIS — S83.412D SPRAIN OF MEDIAL COLLATERAL LIGAMENT OF LEFT KNEE, SUBSEQUENT ENCOUNTER: Primary | ICD-10-CM

## 2024-11-08 DIAGNOSIS — M17.12 PRIMARY OSTEOARTHRITIS OF LEFT KNEE: ICD-10-CM

## 2024-11-08 PROCEDURE — 99213 OFFICE O/P EST LOW 20 MIN: CPT | Performed by: STUDENT IN AN ORGANIZED HEALTH CARE EDUCATION/TRAINING PROGRAM

## 2024-11-08 RX ORDER — MELOXICAM 15 MG/1
15 TABLET ORAL DAILY
Qty: 30 TABLET | Refills: 2 | Status: SHIPPED | OUTPATIENT
Start: 2024-11-08 | End: 2025-02-06

## 2024-11-08 NOTE — PROGRESS NOTES
Ortho Sports Medicine Knee Follow Up Patient Visit       Assesment:   49 y.o. female left knee MCL sprain and mild osteoarthritis     Plan:    The patient's MRI was reviewed with her in detail today.  It was discussed that she has a mild MCL sprain, as well as some early osteoarthritis in her knee particularly in her medial compartment as well as her patellofemoral compartment.  We discussed a corticosteroid injection, the patient declined the injection.  It was discussed that we would like to have her attend formal physical therapy for strengthening.  The patient was also given a hinged knee sleeve brace to help with this time.  Her prescription of meloxicam was also refilled today.  She will follow-up in clinic in 3 months, but sooner if her symptoms increase.       Conservative treatment:    PT for ROM/strengthening to knee, hip and core.  Hinged knee brace ordered.    Imaging:    All imaging from today was reviewed by myself and explained to the patient.       Injection:    No Injection planned at this time.      Surgery:     No surgery is recommended at this point, continue with conservative treatment plan as noted.      Follow up:    Return in about 3 months (around 2/8/2025).        Chief Complaint   Patient presents with    Left Knee - Follow-up       History of Present Illness:    The patient is a 49 y.o. female who was last seen in office on 10/17/2024.  She reports that she is continuing to have pain in her left knee.  She is continuing to have medial knee pain especially with flexion activities. She has been taking meloxicam, and states that this is helping her with her pain.  The patient has obtained an MRI since she was last seen in office, and presents to clinic to review this in detail today. No new injuries since last visit.      Knee Surgical History:  None    Past Medical, Social and Family History:  Past Medical History:   Diagnosis Date    No pertinent past medical history      Past Surgical  History:   Procedure Laterality Date     SECTION      TUBAL LIGATION      -Dr. George     No Known Allergies  Current Outpatient Medications on File Prior to Visit   Medication Sig Dispense Refill    meloxicam (Mobic) 15 mg tablet Take 1 tablet (15 mg total) by mouth daily 30 tablet 0    naproxen (NAPROSYN) 250 mg tablet Take 250 mg by mouth 2 (two) times a day with meals      levothyroxine 25 mcg tablet Take 1 tablet (25 mcg total) by mouth daily in the early morning (Patient not taking: Reported on 2024) 100 tablet 1    meloxicam (Mobic) 7.5 mg tablet Take 1 tablet (7.5 mg total) by mouth daily (Patient not taking: Reported on 10/10/2024) 30 tablet 1    methocarbamol (ROBAXIN) 500 mg tablet Take 1 tablet (500 mg total) by mouth 4 (four) times a day for 5 days (Patient not taking: Reported on 2024) 20 tablet 0    vitamin B-12 (CYANOCOBALAMIN) 500 MCG TABS Take 1 tablet (500 mcg total) by mouth daily (Patient not taking: Reported on 10/10/2024) 90 tablet 2     No current facility-administered medications on file prior to visit.     Social History     Socioeconomic History    Marital status: /Civil Union     Spouse name: Not on file    Number of children: Not on file    Years of education: Not on file    Highest education level: Not on file   Occupational History    Not on file   Tobacco Use    Smoking status: Never    Smokeless tobacco: Never   Vaping Use    Vaping status: Never Used   Substance and Sexual Activity    Alcohol use: Never    Drug use: Never    Sexual activity: Yes     Partners: Male     Birth control/protection: None, Female Sterilization     Comment:  20 yrs - Tubal ligation   Other Topics Concern    Not on file   Social History Narrative    Not on file     Social Determinants of Health     Financial Resource Strain: Not on file   Food Insecurity: Not on file   Transportation Needs: Not on file   Physical Activity: Not on file   Stress: Not on file   Social  "Connections: Not on file   Intimate Partner Violence: Not on file   Housing Stability: Not on file         I have reviewed the past medical, surgical, social and family history, medications and allergies as documented in the EMR.    Review of systems: ROS is negative other than that noted in the HPI.  Constitutional: Negative for fatigue and fever.   HENT: Negative for sore throat.    Respiratory: Negative for shortness of breath.    Cardiovascular: Negative for chest pain.   Gastrointestinal: Negative for abdominal pain.   Endocrine: Negative for cold intolerance and heat intolerance.   Genitourinary: Negative for flank pain.   Musculoskeletal: Negative for back pain.   Skin: Negative for rash.   Allergic/Immunologic: Negative for immunocompromised state.   Neurological: Negative for dizziness.   Psychiatric/Behavioral: Negative for agitation.      Physical Exam:    Height 5' 3\" (1.6 m), weight 62.6 kg (138 lb), last menstrual period 01/01/2023.    General/Constitutional: NAD, well developed, well nourished  HENT: Normocephalic, atraumatic  CV: Intact distal pulses, regular rate  Resp: No respiratory distress or labored breathing  GI: Soft and non-tender   Lymphatic: No lymphadenopathy palpated  Neuro: Alert and Oriented x 3, no focal deficits  Psych: Normal mood, normal affect, normal judgement, normal behavior  Skin: Warm, dry, no rashes, no erythema     Knee Exam (focused):                     RIGHT LEFT   ROM:   0-130 0-130   Crepitus   Negative Negative   Palpation: Effusion negative mild     MJL tenderness Negative Positive     LJL tenderness Negative Negative   Meniscus: Tiffany Negative Positive     Apley's Compression Negative Positive   Instability: Varus at 0 + 30 stable stable     Valgus at 0 + 30 stable stable   Special Tests: Lachman Negative Negative     Posterior drawer Negative Negative     Anterior drawer Negative Negative     Pivot shift not tested not tested     Dial not tested not tested "   Patella: Grind test Negative Negative     Mobility 1/4 1/4     Apprehension Negative Negative   Other: Single leg 1/4 squat not tested not tested       LE NV Exam: +2 DP/PT pulses bilaterally  Sensation intact to light touch L2-S1 bilaterally     Bilateral hip ROM demonstrates no pain actively or passively    No calf tenderness to palpation bilaterally    Knee Imaging    I have personally reviewed and interpreted 4 radiographs of the bilateral knees including bilateral weightbearing AP, weightbearing PA flexion, sunrise, and weightbearing lateral of the affected knee which were reviewed in detail with the patient. The knee is in slight valgus alignment.  The joint spaces of the medial and lateral compartments show no significant degenerative changes.  On the sunrise view, the patellofemoral compartment shows no degenerative changes. No acute fracture or dislocation. No other bony pathology is present.     I personally reviewed and interpreted MRI of the left knee which was reviewed in detail with the patient. This demonstrates grade 1 MCL sprain, mild semimembranosus bursitis, nonspecific edema of the suprapatellar fat pad.  I have reviewed the radiology report and agree with their impression.

## 2024-11-18 ENCOUNTER — TELEPHONE (OUTPATIENT)
Dept: FAMILY MEDICINE CLINIC | Facility: CLINIC | Age: 49
End: 2024-11-18

## 2025-02-07 ENCOUNTER — OFFICE VISIT (OUTPATIENT)
Dept: OBGYN CLINIC | Facility: CLINIC | Age: 50
End: 2025-02-07
Payer: COMMERCIAL

## 2025-02-07 VITALS — BODY MASS INDEX: 24.45 KG/M2 | HEIGHT: 63 IN | WEIGHT: 138 LBS

## 2025-02-07 DIAGNOSIS — S83.412D SPRAIN OF MEDIAL COLLATERAL LIGAMENT OF LEFT KNEE, SUBSEQUENT ENCOUNTER: ICD-10-CM

## 2025-02-07 DIAGNOSIS — M76.892 HAMSTRING TENDINITIS OF LEFT THIGH: ICD-10-CM

## 2025-02-07 DIAGNOSIS — M70.50 PES ANSERINE BURSITIS: ICD-10-CM

## 2025-02-07 DIAGNOSIS — M17.12 PRIMARY OSTEOARTHRITIS OF LEFT KNEE: Primary | ICD-10-CM

## 2025-02-07 PROCEDURE — 99213 OFFICE O/P EST LOW 20 MIN: CPT | Performed by: STUDENT IN AN ORGANIZED HEALTH CARE EDUCATION/TRAINING PROGRAM

## 2025-02-07 RX ORDER — MELOXICAM 15 MG/1
15 TABLET ORAL DAILY
Qty: 30 TABLET | Refills: 2 | Status: SHIPPED | OUTPATIENT
Start: 2025-02-07 | End: 2025-05-08

## 2025-02-07 NOTE — PROGRESS NOTES
Ortho Sports Medicine Knee Follow Up Visit     Assesment:     49 y.o. female left knee pes bursitis / distal hamstring tendinitis     Plan:    Overall Lindsay is doing well. On PE she has hamstring weakness as well as pain at the insertion site and edema. She did attend PT at a friends house and we discussed formal PT will likely be beneficial for strengthening exercises, PT script was provided for PES bursitis exercises, distal hamstring tendinitis exercises and strengthening exercises. A left knee CSI was offered, she declined at this time. A refill of Meloxicam was prescribed and sent to her pharmacy electronically. Follow up in 3 months time for clinical re-evaluation.        Conservative treatment:    PT for ROM/strengthening to knee, hip and core.  Prescription NSAIDS for pain (Meloxicam).  Let pain guide gradual return activities.    Imaging:    No imaging was available for review today.      Injection:    No Injection planned at this time.      Surgery:     No surgery is recommended at this point, continue with conservative treatment plan as noted.    Follow up:    Return in about 3 months (around 2025). No x-rays        Chief Complaint   Patient presents with    Left Knee - Follow-up       History of Present Illness:    The patient is returns for follow up of left knee pain.  Since the prior visit, She reports significant improvement in her symptoms. She notes left medial sided knee pain use to be sharp in nature and claudine constant. Now pain is intermittent and is described more as an irritation. She denies aggravating factors. She did some home PT at a friends house, approx. 20 visits. She will take Meloxicam on an as needed basis and notes that she does need a refill.            Knee Surgical History:  None    Past Medical, Social and Family History:  Past Medical History:   Diagnosis Date    No pertinent past medical history      Past Surgical History:   Procedure Laterality Date     SECTION       TUBAL LIGATION      2015-Dr. George     No Known Allergies  Current Outpatient Medications on File Prior to Visit   Medication Sig Dispense Refill    meloxicam (Mobic) 15 mg tablet Take 1 tablet (15 mg total) by mouth daily 30 tablet 0    levothyroxine 25 mcg tablet Take 1 tablet (25 mcg total) by mouth daily in the early morning (Patient not taking: Reported on 2/7/2025) 100 tablet 1    meloxicam (Mobic) 7.5 mg tablet Take 1 tablet (7.5 mg total) by mouth daily (Patient not taking: Reported on 2/7/2025) 30 tablet 1    methocarbamol (ROBAXIN) 500 mg tablet Take 1 tablet (500 mg total) by mouth 4 (four) times a day for 5 days (Patient not taking: Reported on 8/22/2024) 20 tablet 0    naproxen (NAPROSYN) 250 mg tablet Take 250 mg by mouth 2 (two) times a day with meals (Patient not taking: Reported on 2/7/2025)      vitamin B-12 (CYANOCOBALAMIN) 500 MCG TABS Take 1 tablet (500 mcg total) by mouth daily (Patient not taking: Reported on 2/7/2025) 90 tablet 2    [DISCONTINUED] meloxicam (MOBIC) 15 mg tablet Take 1 tablet (15 mg total) by mouth daily 30 tablet 2     No current facility-administered medications on file prior to visit.     Social History     Socioeconomic History    Marital status: /Civil Union     Spouse name: Not on file    Number of children: Not on file    Years of education: Not on file    Highest education level: Not on file   Occupational History    Not on file   Tobacco Use    Smoking status: Never    Smokeless tobacco: Never   Vaping Use    Vaping status: Never Used   Substance and Sexual Activity    Alcohol use: Never    Drug use: Never    Sexual activity: Yes     Partners: Male     Birth control/protection: None, Female Sterilization     Comment:  20 yrs - Tubal ligation   Other Topics Concern    Not on file   Social History Narrative    Not on file     Social Drivers of Health     Financial Resource Strain: Not on file   Food Insecurity: Not on file   Transportation Needs: Not on  "file   Physical Activity: Not on file   Stress: Not on file   Social Connections: Not on file   Intimate Partner Violence: Not on file   Housing Stability: Not on file         I have reviewed the past medical, surgical, social and family history, medications and allergies as documented in the EMR.    Review of systems: ROS is negative other than that noted in the HPI.  Constitutional: Negative for fatigue and fever.      Physical Exam:    Height 5' 3\" (1.6 m), weight 62.6 kg (138 lb), last menstrual period 01/01/2023.    General/Constitutional: NAD, well developed, well nourished  HENT: Normocephalic, atraumatic  CV: Intact distal pulses, regular rate  Resp: No respiratory distress or labored breathing  Lymphatic: No lymphadenopathy palpated  Neuro: Alert and Oriented x 3, no focal deficits  Psych: Normal mood, normal affect, normal judgement, normal behavior  Skin: Warm, dry, no rashes, no erythema      Knee Exam (focused):  Visual inspection of the left knee demonstrates normal contour without atrophy.   No previous incisions   There is no significant erythema or edema.    No significant joint effusion   Range of motion is full from 0-130 degrees of flexion   Able to straight leg raise   Hamstring insertion tender to palpation, tender over pes  No medial joint line tenderness, No lateral joint line tenderness  Negative medial Tiffany's, Negative lateral Tiffany's  Stable Lachman exam, Stable posterior drawer  Stable to varus and valgus stress at both 0 and 30°  Patella tracks normally.  No J sign.  No apprehension.  Translation is approximately 2 quadrants and is equal to the contralateral side  Patellar eversion is similar to the contralateral side    Examination of the patient's ipsilateral hip demonstrates full painless range of motion.  No crepitus.           LE NV Exam: +2 DP/PT pulses bilaterally  Sensation intact to light touch L2-S1 bilaterally    No calf tenderness to palpation bilaterally      Knee " Imaging    No imaging was performed today      Scribe Attestation      I,:  Nakia Arzola MA am acting as a scribe while in the presence of the attending physician.:       I,:  Johnnie Block MD personally performed the services described in this documentation    as scribed in my presence.:

## 2025-03-04 ENCOUNTER — EVALUATION (OUTPATIENT)
Dept: PHYSICAL THERAPY | Facility: CLINIC | Age: 50
End: 2025-03-04
Payer: COMMERCIAL

## 2025-03-04 DIAGNOSIS — M17.12 PRIMARY OSTEOARTHRITIS OF LEFT KNEE: ICD-10-CM

## 2025-03-04 DIAGNOSIS — S83.412D SPRAIN OF MEDIAL COLLATERAL LIGAMENT OF LEFT KNEE, SUBSEQUENT ENCOUNTER: ICD-10-CM

## 2025-03-04 PROCEDURE — 97162 PT EVAL MOD COMPLEX 30 MIN: CPT | Performed by: PHYSICAL THERAPIST

## 2025-03-04 NOTE — PROGRESS NOTES
PT Evaluation     Today's date: 3/4/2025  Patient name: Reddy Houston  : 1975  MRN: [id number]  Referring provider: Johnnie Block MD  Dx:   Encounter Diagnosis     ICD-10-CM    1. Sprain of medial collateral ligament of left knee, subsequent encounter  S83.412D Ambulatory Referral to Physical Therapy      2. Primary osteoarthritis of left knee  M17.12 Ambulatory Referral to Physical Therapy                     Assessment  Impairments: abnormal muscle tone, activity intolerance, impaired physical strength, pain with function and weight-bearing intolerance    Assessment details: Signs and symptoms are consistent with left hamstring tendonitis due impaired LLE strength and pain with muscle activation.  The patient has difficulty with standing, bending her knee and twisting her knee and this is limiting her ability to complete ADLs.  The patient knee ROM looks good so we will be focusing more on functional movements rather than stretching.  The main focus will be to improve her LLE strength to help reduce stress on the hamstring tendon.  The patient would benefit from PT to help improve strength reduce pain and improve functional movements.    Understanding of Dx/Px/POC: excellent     Prognosis: excellent    Goals  STG : (2 weeks) - Patient demonstrates independence with HEP to be able to transition to HEP in the long term.  (4 weeks) - Decrease patient's pain by 50%.      LTG: (6 weeks) - The patient will be able to demonstrate proper squat form and the motion will be pain free  (8 weeks) - Improve patient's LLE strength to at least 4/5 to allow the patient to walk long distances without pain.       Plan  Patient would benefit from: skilled physical therapy  Planned modality interventions: TENS, thermotherapy: hydrocollator packs and cryotherapy    Planned therapy interventions: neuromuscular re-education, manual therapy, therapeutic activities, therapeutic exercise and gait training    Frequency: 2x  week  Duration in weeks: 8      Subjective Evaluation    History of Present Illness  Mechanism of injury: The patient reports that about 4-5 months the patient reports that one day she woke up with L medial knee pain.  She is not sure what caused the pain.  She states that the pain did not slowly start up.  She states that she woke up with immediate pain.  She had an X-ray and then MRI.  The MRI showed a grade 1 MCL sprain and slight tendonitis of the semimembranosus.  The patient reports that she was taking pain medication every day but now she is not taking medication unless she really needs it.  She describes that pain feels like a stabbing.  The patient denies any numbness or tingling.  She states that he pain is constant regardless of what she does.  She feels worse if she bends her knee too much or if she goes into a valgus position.  She also has issues with standing if she fully extends her knee.   Patient Goals  Patient goal: The patient wants her pain to go away.  Pain  Current pain ratin  At worst pain ratin  Quality: knife-like        Objective     Active Range of Motion   Left Knee   Flexion: 141 degrees   Extension: 4 degrees     Right Knee   Flexion: 145 degrees   Extension: 4 degrees   Mechanical Assessment    Cervical      Thoracic      Lumbar    Sitting flexion: repeated movements  Pain location: no change  Standing extension: repeated movements  Pain location: no change    Strength/Myotome Testing     Left Hip   Planes of Motion   Flexion: 4-  Extension: 3+  Abduction: 4-  External rotation: 3+    Right Hip   Planes of Motion   Flexion: 4-  Extension: 3+  Abduction: 4-  External rotation: 4-    Left Knee   Flexion: 3+  Extension: 4    Right Knee   Flexion: 4-  Extension: 4+    Additional Strength Details  Pain with knee flexion    Functional Assessment      Squat    Left valgus, left tibial anterior translation beyond toes and right tibial anterior translation beyond toes.     Single Leg  Squat   Left Leg  Valgus and anterior tibial translation beyond toes.     Right Leg  Valgus and tibial anterior translation beyond toes.            Precautions:      POC Expires Auth Status Start Date Expiration Date PT Visit Limit   No Auth        Date 3/4       Used 1       Remaining           Diagnosis:    Precautions:    Manuals 3/4                                               There Ex        S/L clams GTB x20       Prone HS curl BTB x20                                                               Neuro Re-Ed        Heel bridge 2x10                                                                                                        Ther Act                                         Modalities

## 2025-03-05 ENCOUNTER — APPOINTMENT (OUTPATIENT)
Dept: PHYSICAL THERAPY | Facility: CLINIC | Age: 50
End: 2025-03-05
Payer: COMMERCIAL

## 2025-03-07 ENCOUNTER — OFFICE VISIT (OUTPATIENT)
Dept: PHYSICAL THERAPY | Facility: CLINIC | Age: 50
End: 2025-03-07
Payer: COMMERCIAL

## 2025-03-07 DIAGNOSIS — S76.312D HAMSTRING STRAIN, LEFT, SUBSEQUENT ENCOUNTER: Primary | ICD-10-CM

## 2025-03-07 DIAGNOSIS — S83.412D SPRAIN OF MEDIAL COLLATERAL LIGAMENT OF LEFT KNEE, SUBSEQUENT ENCOUNTER: ICD-10-CM

## 2025-03-07 DIAGNOSIS — M17.12 PRIMARY OSTEOARTHRITIS OF LEFT KNEE: ICD-10-CM

## 2025-03-07 PROCEDURE — 97110 THERAPEUTIC EXERCISES: CPT | Performed by: PHYSICAL THERAPIST

## 2025-03-07 NOTE — PROGRESS NOTES
Daily Note     Today's date: 3/7/2025  Patient name: Reddy Houston  : 1975  MRN: [id number]  Referring provider: Johnnie Block MD  Dx:   Encounter Diagnosis     ICD-10-CM    1. Hamstring strain, left, subsequent encounter  S76.312D       2. Sprain of medial collateral ligament of left knee, subsequent encounter  S83.412D       3. Primary osteoarthritis of left knee  M17.12                      Subjective: The patient reports that her knee is already feeling better.      Objective: See treatment diary below      Assessment: Tolerated treatment well. Patient demonstrated fatigue post treatment and would benefit from continued PT    The patient did very well for her first treatment session.  She was able to complete her strengthening exercises without any increase in knee symptoms.  When she was walking she felt fine but when she turns she did get some slight pain in the knee.     Plan: Continue per plan of care.      Precautions:      POC Expires Auth Status Start Date Expiration Date PT Visit Limit   No Auth        Date 3/4 3/7      Used 1 2      Remaining           Diagnosis:    Precautions:    Manuals 3/4 3/7                                              There Ex        Rec bike for endurance  5'      S/L clams GTB x20 GTB x20      Prone HS curl BTB x20 Seated      SLR   2# 2x10      LAQ  3# 2x10      Standing hip ABD  GTB 2x10ea      Standing hip ext  GTB 2x10ea                              Neuro Re-Ed        Heel bridge 2x10 2x10                                                                                                       Ther Act                                         Modalities

## 2025-03-10 ENCOUNTER — APPOINTMENT (OUTPATIENT)
Dept: PHYSICAL THERAPY | Facility: CLINIC | Age: 50
End: 2025-03-10
Payer: COMMERCIAL

## 2025-03-11 ENCOUNTER — APPOINTMENT (OUTPATIENT)
Dept: PHYSICAL THERAPY | Facility: CLINIC | Age: 50
End: 2025-03-11
Payer: COMMERCIAL

## 2025-03-11 ENCOUNTER — OFFICE VISIT (OUTPATIENT)
Dept: PHYSICAL THERAPY | Facility: CLINIC | Age: 50
End: 2025-03-11
Payer: COMMERCIAL

## 2025-03-11 DIAGNOSIS — S83.412D SPRAIN OF MEDIAL COLLATERAL LIGAMENT OF LEFT KNEE, SUBSEQUENT ENCOUNTER: Primary | ICD-10-CM

## 2025-03-11 DIAGNOSIS — S76.312D HAMSTRING STRAIN, LEFT, SUBSEQUENT ENCOUNTER: ICD-10-CM

## 2025-03-11 DIAGNOSIS — M17.12 PRIMARY OSTEOARTHRITIS OF LEFT KNEE: ICD-10-CM

## 2025-03-11 PROCEDURE — 97110 THERAPEUTIC EXERCISES: CPT

## 2025-03-11 PROCEDURE — 97112 NEUROMUSCULAR REEDUCATION: CPT

## 2025-03-11 NOTE — PROGRESS NOTES
Daily Note     Today's date: 3/11/2025  Patient name: Reddy Houston  : 1975  MRN: [id number]  Referring provider: Johnnie Block MD  Dx:   Encounter Diagnosis     ICD-10-CM    1. Sprain of medial collateral ligament of left knee, subsequent encounter  S83.412D       2. Primary osteoarthritis of left knee  M17.12       3. Hamstring strain, left, subsequent encounter  S76.312D           Start Time: 08  Stop Time: 910  Total time in clinic (min): 40 minutes    Subjective: Pt states her knee continues to feel better compared to first visit, but is having slight irritation compared to last visit. Wouldn't call it pain. She is working on the HEP and all is going well with that.       Objective: See treatment diary below      Assessment: Tolerated treatment well. No pain throughout. Verbal cues for proper form during standing hip abduction exercises. Added HR today with good tolerance. Patient demonstrated fatigue post treatment and would benefit from continued PT       Plan: Continue per plan of care.      Precautions:      POC Expires Auth Status Start Date Expiration Date PT Visit Limit   No Auth        Date 3/4 3/7 3/11     Used 1 2 3     Remaining           Diagnosis:    Precautions:    Manuals 3/4 3/7 3/11                                             There Ex        Rec bike for endurance  5' 5'     S/L clams GTB x20 GTB x20 GTB x20      Prone HS curl BTB x20 Seated Prone no resistance 2x10      SLR   2# 2x10 2# 2x10      LAQ  3# 2x10 3# 2x10      Standing hip ABD  GTB 2x10ea GTB 2x10 ea     Standing hip ext  GTB 2x10ea GTB 2x10 ea      Heel raises   2x15                     Neuro Re-Ed        Heel bridge 2x10 2x10 2x10                                                                                                       Ther Act                                         Modalities

## 2025-03-14 ENCOUNTER — OFFICE VISIT (OUTPATIENT)
Dept: PHYSICAL THERAPY | Facility: CLINIC | Age: 50
End: 2025-03-14
Payer: COMMERCIAL

## 2025-03-14 DIAGNOSIS — S76.312D HAMSTRING STRAIN, LEFT, SUBSEQUENT ENCOUNTER: ICD-10-CM

## 2025-03-14 DIAGNOSIS — M17.12 PRIMARY OSTEOARTHRITIS OF LEFT KNEE: ICD-10-CM

## 2025-03-14 DIAGNOSIS — S83.412D SPRAIN OF MEDIAL COLLATERAL LIGAMENT OF LEFT KNEE, SUBSEQUENT ENCOUNTER: Primary | ICD-10-CM

## 2025-03-14 PROCEDURE — 97140 MANUAL THERAPY 1/> REGIONS: CPT | Performed by: PHYSICAL THERAPIST

## 2025-03-14 PROCEDURE — 97110 THERAPEUTIC EXERCISES: CPT | Performed by: PHYSICAL THERAPIST

## 2025-03-14 NOTE — PROGRESS NOTES
"Daily Note     Today's date: 3/14/2025  Patient name: Reddy Houston  : 1975  MRN: [id number]  Referring provider: Johnnie Block MD  Dx:   Encounter Diagnosis     ICD-10-CM    1. Sprain of medial collateral ligament of left knee, subsequent encounter  S83.412D       2. Primary osteoarthritis of left knee  M17.12       3. Hamstring strain, left, subsequent encounter  S76.312D                      Subjective: The patient reports that she has been feeling more sore lately in and around the knee.        Objective: See treatment diary below      Assessment: Tolerated treatment well. Patient demonstrated fatigue post treatment and would benefit from continued PT    Today we did a little more stretching and I did some manuals on the knee to help reduce pain and loosen the knee up.  She tolerated this well and seemed to have minimal discomfort with this.  The more she was talking to me during the session, her symptoms seem to be related to delayed onset muscle soreness and I talked to her about how this is normal in the strengthening process.    Plan: Continue per plan of care.      Precautions:      POC Expires Auth Status Start Date Expiration Date PT Visit Limit   No Auth        Date 3/4 3/7 3/11 3/14    Used 1 2 3 4    Remaining           Diagnosis:    Precautions:    Manuals 3/4 3/7 3/11 3/14    Hamstring rollout    AB                                    There Ex        Rec bike for endurance  5' 5' 5'    S/L clams GTB x20 GTB x20 GTB x20  GTB x20ea    Prone HS curl BTB x20 Seated GTB x20 Prone no resistance 2x10      SLR   2# 2x10 2# 2x10  1# 2x10    LAQ  3# 2x10 3# 2x10  2# 2x10    Standing hip ABD  GTB 2x10ea GTB 2x10 ea     Standing hip ext  GTB 2x10ea GTB 2x10 ea      Heel raises   2x15     Prone quad st    3x30\"    Hamstring st    3x30\"    Neuro Re-Ed        Heel bridge 2x10 2x10 2x10  2x10                                                                                                     Ther Act     "                                     Modalities

## 2025-03-17 ENCOUNTER — OFFICE VISIT (OUTPATIENT)
Dept: PHYSICAL THERAPY | Facility: CLINIC | Age: 50
End: 2025-03-17
Payer: COMMERCIAL

## 2025-03-17 DIAGNOSIS — S76.312D HAMSTRING STRAIN, LEFT, SUBSEQUENT ENCOUNTER: ICD-10-CM

## 2025-03-17 DIAGNOSIS — S83.412D SPRAIN OF MEDIAL COLLATERAL LIGAMENT OF LEFT KNEE, SUBSEQUENT ENCOUNTER: Primary | ICD-10-CM

## 2025-03-17 DIAGNOSIS — M17.12 PRIMARY OSTEOARTHRITIS OF LEFT KNEE: ICD-10-CM

## 2025-03-17 PROCEDURE — 97110 THERAPEUTIC EXERCISES: CPT | Performed by: PHYSICAL THERAPIST

## 2025-03-17 PROCEDURE — 97140 MANUAL THERAPY 1/> REGIONS: CPT | Performed by: PHYSICAL THERAPIST

## 2025-03-17 PROCEDURE — 97112 NEUROMUSCULAR REEDUCATION: CPT | Performed by: PHYSICAL THERAPIST

## 2025-03-17 NOTE — PROGRESS NOTES
"Daily Note     Today's date: 3/17/2025  Patient name: Reddy Houston  : 1975  MRN: [id number]  Referring provider: Johnnie Block MD  Dx:   Encounter Diagnosis     ICD-10-CM    1. Sprain of medial collateral ligament of left knee, subsequent encounter  S83.412D       2. Primary osteoarthritis of left knee  M17.12       3. Hamstring strain, left, subsequent encounter  S76.312D                      Subjective: The patient reports that her knee is still irritated but she did not have any increase in soreness after last session.        Objective: See treatment diary below      Assessment: Tolerated treatment well. Patient demonstrated fatigue post treatment and would benefit from continued PT    The patient seemed to tolerate last session a little better so we did several of the same exercises.  She did respond well to LS stretching today so we worked on more core and stretching than normal.  Standing LS extension was also added to her HEP.    Plan: Continue per plan of care.      Precautions:      POC Expires Auth Status Start Date Expiration Date PT Visit Limit   No Auth        Date 3/4 3/7 3/11 3/14 3/17   Used 1 2 3 4    Remaining           Diagnosis:    Precautions:    Manuals 3/4 3/7 3/11 3/14 3/17   Hamstring rollout    AB    LS PA mobs     Gr III-IV                           There Ex        Standing LS ext     2x20   Rec bike for endurance  5' 5' 5' 5'   S/L clams GTB x20 GTB x20 GTB x20  GTB x20ea    Prone HS curl BTB x20 Seated GTB x20 Prone no resistance 2x10   GTB x20 seated   SLR   2# 2x10 2# 2x10  1# 2x10    LAQ  3# 2x10 3# 2x10  2# 2x10    Standing hip ABD  GTB 2x10ea GTB 2x10 ea     Standing hip ext  GTB 2x10ea GTB 2x10 ea      Heel raises   2x15     Prone quad st    3x30\" 3x30\"ea   Hamstring st    3x30\" 3x30\"ea   Neuro Re-Ed        Heel bridge 2x10 2x10 2x10  2x10 2x10   Prone Alt legs     x15ea   Prone Alt arms     x15ea                                                                        "             Ther Act                                         Modalities

## 2025-03-21 ENCOUNTER — OFFICE VISIT (OUTPATIENT)
Dept: PHYSICAL THERAPY | Facility: CLINIC | Age: 50
End: 2025-03-21
Payer: COMMERCIAL

## 2025-03-21 DIAGNOSIS — M17.12 PRIMARY OSTEOARTHRITIS OF LEFT KNEE: ICD-10-CM

## 2025-03-21 DIAGNOSIS — S76.312D HAMSTRING STRAIN, LEFT, SUBSEQUENT ENCOUNTER: ICD-10-CM

## 2025-03-21 DIAGNOSIS — S83.412D SPRAIN OF MEDIAL COLLATERAL LIGAMENT OF LEFT KNEE, SUBSEQUENT ENCOUNTER: Primary | ICD-10-CM

## 2025-03-21 PROCEDURE — 97014 ELECTRIC STIMULATION THERAPY: CPT | Performed by: PHYSICAL THERAPIST

## 2025-03-21 PROCEDURE — 97140 MANUAL THERAPY 1/> REGIONS: CPT | Performed by: PHYSICAL THERAPIST

## 2025-03-21 PROCEDURE — 97110 THERAPEUTIC EXERCISES: CPT | Performed by: PHYSICAL THERAPIST

## 2025-03-21 PROCEDURE — 97112 NEUROMUSCULAR REEDUCATION: CPT | Performed by: PHYSICAL THERAPIST

## 2025-03-21 NOTE — PROGRESS NOTES
"Daily Note     Today's date: 3/21/2025  Patient name: Reddy Houston  : 1975  MRN: [id number]  Referring provider: Johnnie Block MD  Dx:   Encounter Diagnosis     ICD-10-CM    1. Sprain of medial collateral ligament of left knee, subsequent encounter  S83.412D       2. Primary osteoarthritis of left knee  M17.12       3. Hamstring strain, left, subsequent encounter  S76.312D                      Subjective: The patient reports that she has some discomfort in the knee but it is not sore like it was last session.        Objective: See treatment diary below      Assessment: Tolerated treatment well. Patient demonstrated fatigue post treatment and would benefit from continued PT    The patient seems to be responding well to her lumbar extensions so I added in anti rotations to also work on her core and she tolerated this well.  The squat machine was also added in today to challenge her BLE strength and she demonstrated good form.    Plan: Continue per plan of care.      Precautions:      POC Expires Auth Status Start Date Expiration Date PT Visit Limit   No Auth        Date 3/20 3/7 3/11 3/14 3/17   Used 6 2 3 4 5   Remaining           Diagnosis:    Precautions:    Manuals 3/4 3/7 3/11 3/14 3/17   Hamstring rollout AB   AB    LS PA mobs     Gr III-IV                           There Ex        Standing LS ext x20    2x20   Rec bike for endurance  5' 5' 5' 5'   S/L clams GTB x20 GTB x20 GTB x20  GTB x20ea    Prone HS curl Seated GTB x20ea Seated GTB x20 Prone no resistance 2x10   GTB x20 seated   SLR   2# 2x10 2# 2x10  1# 2x10    LAQ 3# 2x10ea 3# 2x10 3# 2x10  2# 2x10    Standing hip ABD  GTB 2x10ea GTB 2x10 ea     Standing hip ext  GTB 2x10ea GTB 2x10 ea      Heel raises   2x15     Prone quad st    3x30\" 3x30\"ea   Hamstring st    3x30\" 3x30\"ea   Neuro Re-Ed        Heel bridge 2x10 2x10 2x10  2x10 2x10   Prone Alt legs x15ea    x15ea   Prone Alt arms x15ea    x15ea   Anti rotations BLK 5\"x10ea                  "                                                               Ther Act              Lat walkouts  10# x3 laps                         Modalities

## 2025-03-24 ENCOUNTER — APPOINTMENT (OUTPATIENT)
Dept: PHYSICAL THERAPY | Facility: CLINIC | Age: 50
End: 2025-03-24
Payer: COMMERCIAL

## 2025-03-28 ENCOUNTER — APPOINTMENT (OUTPATIENT)
Dept: PHYSICAL THERAPY | Facility: CLINIC | Age: 50
End: 2025-03-28
Payer: COMMERCIAL